# Patient Record
Sex: FEMALE | Race: BLACK OR AFRICAN AMERICAN | Employment: UNEMPLOYED | URBAN - METROPOLITAN AREA
[De-identification: names, ages, dates, MRNs, and addresses within clinical notes are randomized per-mention and may not be internally consistent; named-entity substitution may affect disease eponyms.]

---

## 2017-07-21 ENCOUNTER — OFFICE VISIT (OUTPATIENT)
Dept: FAMILY MEDICINE | Facility: CLINIC | Age: 62
End: 2017-07-21
Payer: COMMERCIAL

## 2017-07-21 VITALS
WEIGHT: 182 LBS | BODY MASS INDEX: 33.49 KG/M2 | RESPIRATION RATE: 16 BRPM | HEIGHT: 62 IN | DIASTOLIC BLOOD PRESSURE: 62 MMHG | TEMPERATURE: 98.6 F | HEART RATE: 78 BPM | SYSTOLIC BLOOD PRESSURE: 112 MMHG | OXYGEN SATURATION: 99 %

## 2017-07-21 DIAGNOSIS — R10.12 LEFT UPPER QUADRANT PAIN: ICD-10-CM

## 2017-07-21 DIAGNOSIS — E78.5 HYPERLIPIDEMIA WITH TARGET LDL LESS THAN 100: Chronic | ICD-10-CM

## 2017-07-21 DIAGNOSIS — Z22.7 LATENT TUBERCULOSIS BY BLOOD TEST: ICD-10-CM

## 2017-07-21 DIAGNOSIS — E66.01 MORBID OBESITY DUE TO EXCESS CALORIES (H): ICD-10-CM

## 2017-07-21 DIAGNOSIS — E11.9 CONTROLLED TYPE 2 DIABETES MELLITUS WITHOUT COMPLICATION, WITH LONG-TERM CURRENT USE OF INSULIN (H): Primary | ICD-10-CM

## 2017-07-21 DIAGNOSIS — M54.2 CERVICALGIA: ICD-10-CM

## 2017-07-21 DIAGNOSIS — G89.29 CHRONIC LOW BACK PAIN WITHOUT SCIATICA, UNSPECIFIED BACK PAIN LATERALITY: ICD-10-CM

## 2017-07-21 DIAGNOSIS — R10.13 ABDOMINAL PAIN, EPIGASTRIC: ICD-10-CM

## 2017-07-21 DIAGNOSIS — M54.50 CHRONIC LOW BACK PAIN WITHOUT SCIATICA, UNSPECIFIED BACK PAIN LATERALITY: ICD-10-CM

## 2017-07-21 DIAGNOSIS — Z79.4 CONTROLLED TYPE 2 DIABETES MELLITUS WITHOUT COMPLICATION, WITH LONG-TERM CURRENT USE OF INSULIN (H): Primary | ICD-10-CM

## 2017-07-21 DIAGNOSIS — K75.81 NASH (NONALCOHOLIC STEATOHEPATITIS): ICD-10-CM

## 2017-07-21 DIAGNOSIS — E55.9 VITAMIN D DEFICIENCY: Chronic | ICD-10-CM

## 2017-07-21 DIAGNOSIS — H81.12 BPPV (BENIGN PAROXYSMAL POSITIONAL VERTIGO), LEFT: ICD-10-CM

## 2017-07-21 DIAGNOSIS — F32.5 MAJOR DEPRESSION IN COMPLETE REMISSION (H): Chronic | ICD-10-CM

## 2017-07-21 DIAGNOSIS — K21.9 GASTROESOPHAGEAL REFLUX DISEASE WITHOUT ESOPHAGITIS: ICD-10-CM

## 2017-07-21 LAB
BASOPHILS # BLD AUTO: 0 10E9/L (ref 0–0.2)
BASOPHILS NFR BLD AUTO: 0.4 %
DIFFERENTIAL METHOD BLD: NORMAL
EOSINOPHIL # BLD AUTO: 0.2 10E9/L (ref 0–0.7)
EOSINOPHIL NFR BLD AUTO: 4.7 %
ERYTHROCYTE [DISTWIDTH] IN BLOOD BY AUTOMATED COUNT: 13.5 % (ref 10–15)
HBA1C MFR BLD: 5.8 % (ref 4.3–6)
HCT VFR BLD AUTO: 40.6 % (ref 35–47)
HGB BLD-MCNC: 13.6 G/DL (ref 11.7–15.7)
LYMPHOCYTES # BLD AUTO: 1.8 10E9/L (ref 0.8–5.3)
LYMPHOCYTES NFR BLD AUTO: 38.7 %
MCH RBC QN AUTO: 30.5 PG (ref 26.5–33)
MCHC RBC AUTO-ENTMCNC: 33.5 G/DL (ref 31.5–36.5)
MCV RBC AUTO: 91 FL (ref 78–100)
MONOCYTES # BLD AUTO: 0.3 10E9/L (ref 0–1.3)
MONOCYTES NFR BLD AUTO: 5.3 %
NEUTROPHILS # BLD AUTO: 2.4 10E9/L (ref 1.6–8.3)
NEUTROPHILS NFR BLD AUTO: 50.9 %
PLATELET # BLD AUTO: 208 10E9/L (ref 150–450)
RBC # BLD AUTO: 4.46 10E12/L (ref 3.8–5.2)
WBC # BLD AUTO: 4.7 10E9/L (ref 4–11)

## 2017-07-21 PROCEDURE — 99214 OFFICE O/P EST MOD 30 MIN: CPT | Performed by: FAMILY MEDICINE

## 2017-07-21 PROCEDURE — 84443 ASSAY THYROID STIM HORMONE: CPT | Performed by: FAMILY MEDICINE

## 2017-07-21 PROCEDURE — 80061 LIPID PANEL: CPT | Performed by: FAMILY MEDICINE

## 2017-07-21 PROCEDURE — 83036 HEMOGLOBIN GLYCOSYLATED A1C: CPT | Performed by: FAMILY MEDICINE

## 2017-07-21 PROCEDURE — 80048 BASIC METABOLIC PNL TOTAL CA: CPT | Performed by: FAMILY MEDICINE

## 2017-07-21 PROCEDURE — 84460 ALANINE AMINO (ALT) (SGPT): CPT | Performed by: FAMILY MEDICINE

## 2017-07-21 PROCEDURE — 82306 VITAMIN D 25 HYDROXY: CPT | Performed by: FAMILY MEDICINE

## 2017-07-21 PROCEDURE — 85025 COMPLETE CBC W/AUTO DIFF WBC: CPT | Performed by: FAMILY MEDICINE

## 2017-07-21 PROCEDURE — 36415 COLL VENOUS BLD VENIPUNCTURE: CPT | Performed by: FAMILY MEDICINE

## 2017-07-21 RX ORDER — MELOXICAM 15 MG/1
15 TABLET ORAL DAILY
Qty: 30 TABLET | Refills: 1 | Status: SHIPPED | OUTPATIENT
Start: 2017-07-21 | End: 2017-08-22

## 2017-07-21 RX ORDER — NICOTINE POLACRILEX 4 MG/1
40 GUM, CHEWING ORAL DAILY
Qty: 60 TABLET | Refills: 11 | Status: SHIPPED | OUTPATIENT
Start: 2017-07-21 | End: 2018-02-05

## 2017-07-21 NOTE — MR AVS SNAPSHOT
After Visit Summary   7/21/2017    Clemente Mancilla    MRN: [id number]           Patient Information     Date Of Birth          1955        Visit Information        Provider Department      7/21/2017 1:00 PM William Cook MD; [facility] [employer] [facility]        Today's Diagnoses     Controlled type 2 diabetes mellitus without complication, with long-term current use of insulin (H)    -  1    Hyperlipidemia with target LDL less than 100        Major depression in complete remission (H)        Osteoarthrosis involving lower leg        Cervicalgia        Chronic low back pain without sciatica, unspecified back pain laterality        KOHLER (nonalcoholic steatohepatitis)/US of 6-12        Morbid obesity due to excess calories (H)        Latent tuberculosis by blood test: 6-12 see letter from [facility] Pt refusing Rx         Vitamin D deficiency        Gastroesophageal reflux disease without esophagitis        Left upper quadrant pain        Abdominal pain, epigastric        BPPV (benign paroxysmal positional vertigo), left          Care Instructions    (E11.9,  Z79.4) Controlled type 2 diabetes mellitus without complication, with long-term current use of insulin (H)  (primary encounter diagnosis)  Comment:    Plan: Basic metabolic panel, Albumin Random Urine         Quantitative, Hemoglobin A1c, TSH             (E78.5) Hyperlipidemia with target LDL less than 100  Comment:    Plan: Lipid panel reflex to direct LDL, ALT             (F32.5) Major depression in complete remission (H)  Comment:    Plan:      (M17.10) Osteoarthrosis involving lower leg  Comment:    Plan: meloxicam (MOBIC) 15 MG tablet             (M54.2) Cervicalgia  Comment:    Plan:      (M54.5,  G89.29) Chronic low back pain without sciatica, unspecified back pain laterality  Comment:    Plan:      (K75.81) KOHLER (nonalcoholic steatohepatitis)/US of 6-12  Comment:     Plan:      (E66.01) Morbid obesity due to excess calories (H)  Comment:    Plan:      (R76.11) Latent tuberculosis by blood test: 6-12 see letter from [facility] refusing Rx   Comment:    Plan:      (E55.9) Vitamin D deficiency  Comment:    Plan: Vitamin D Deficiency             (K21.9) Gastroesophageal reflux disease without esophagitis  Comment:    Plan: CBC with platelets differential             (R10.12) Left upper quadrant pain  Comment:    Plan: omeprazole 20 MG tablet             (R10.13) Abdominal pain, epigastric  Comment:    Plan: omeprazole 20 MG tablet             (H81.12) BPPV (benign paroxysmal positional vertigo), left  Comment:    Plan: AUDIOLOGY BALANCE REFERRAL                         Follow-ups after your visit        Additional Services     AUDIOLOGY BALANCE REFERRAL       Your provider has referred you to: [facility] [phone] [email or web address]  BENIGN POSITIONAL VERTIGO LEFT SIDED     Audiology Balance Referral (Comprehensive) includes Videonystagmography (VNG), Rotational Chair testing, and Computerized Dynamic Posturography.  You may also select individual tests from the list below.    Procedure(s): Audiology Balance Referral Comprehensive (includes Videonystagmography (VNG), Rotational Chair Testing and Computerized Dynamic Posturography)                  Who to contact     If you have questions or need follow up information about today's clinic visit or your schedule please contact [facility] directly at [phone].  Normal or non-critical lab and imaging results will be communicated to you by MyChart, letter or phone within 4 business days after the clinic has received the results. If you do not hear from us within 7 days, please contact the clinic through MyChart or phone. If you have a critical or abnormal lab result, we will notify you by phone as soon as  "possible.  Submit refill requests through [employer] or call your pharmacy and they will forward the refill request to us. Please allow 3 business days for your refill to be completed.          Additional Information About Your Visit        [employer] Information     [employer] lets you send messages to your doctor, view your test results, renew your prescriptions, schedule appointments and more. To sign up, go to [email or web address] . Click on \"Log in\" on the left side of the screen, which will take you to the Welcome page. Then click on \"Sign up Now\" on the right side of the page.     You will be asked to enter the access code listed below, as well as some personal information. Please follow the directions to create your username and password.     Your access code is: [email or web address]  Expires: 10/19/2017  2:16 PM     Your access code will  in 90 days. If you need help or a new code, please call your [place] clinic or [phone].        Care EveryWhere ID     This is your Care EveryWhere ID. This could be used by other organizations to access your [place] medical records  [id number]        Your Vitals Were     Pulse Temperature Respirations Height Pulse Oximetry BMI (Body Mass Index)    78 98.6  F (37  C) (Tympanic) 16 5' 2\" (1.575 m) 99% 33.29 kg/m2       Blood Pressure from Last 3 Encounters:   17 112/62   11/06/15 112/68   10/02/15 110/62    Weight from Last 3 Encounters:   17 182 lb (82.6 kg)   11/06/15 186 lb 3.2 oz (84.5 kg)   10/02/15 187 lb 3.2 oz (84.9 kg)              We Performed the Following     Albumin Random Urine Quantitative     ALT     AUDIOLOGY BALANCE REFERRAL     Basic metabolic panel     CBC with platelets differential     DEPRESSION ACTION PLAN (DAP)     Hemoglobin A1c     Lipid panel reflex to direct LDL     TSH     Vitamin D Deficiency          Today's Medication Changes          These changes are accurate as of: 17  2:16 PM.  If you have any questions, ask your nurse " or doctor.               Start taking these medicines.        Dose/Directions    meloxicam 15 MG tablet   Commonly known as:  MOBIC   Used for:  Osteoarthrosis involving lower leg   Started by:  Willima Cook MD        Dose:  15 mg   Take 1 tablet (15 mg) by mouth daily   Quantity:  30 tablet   Refills:  1         These medicines have changed or have updated prescriptions.        Dose/Directions    omeprazole 20 MG tablet   This may have changed:  how much to take   Used for:  Left upper quadrant pain, Abdominal pain, epigastric   Changed by:  William Cook MD        Dose:  40 mg   Take 2 tablets (40 mg) by mouth daily Take 30-60 minutes before a meal.   Quantity:  60 tablet   Refills:  11            Where to get your medicines      These medications were sent to [facility] - [place], [place] - [address] [facility]  [address] [address]     Phone:  [phone]     meloxicam 15 MG tablet    omeprazole 20 MG tablet                Primary Care Provider Office Phone # Fax #    Payal Julianna Cook [phone]       [facility] [id number] [facility] [address]        Equal Access to Services     DENISE [facility] Hospital[facility] : Hadii aad ku hadasho Soomaali, waaxda luqadaha, qaybta kaalmada adeegyada, waxay bertha zeen orion salguero. So [facility] [phone].    ATENCIÓN: Si habla español, tiene a morgan disposición servicios gratuitos de asistencia lingüística. Llame al [phone].    We comply with applicable federal civil rights laws and [place] laws. We do not discriminate on the basis of race, color, national origin, age, disability sex, sexual orientation or gender identity.            Thank you!     Thank you for choosing [facility]  for your care. Our goal is always to provide you with excellent care. Hearing back from our patients is one way we can continue to improve our services. Please take a  few minutes to complete the written survey that you may receive in the mail after your visit with us. Thank you!             Your Updated Medication List - Protect others around you: Learn how to safely use, store and throw away your medicines at www.disposemymeds.org.          This list is accurate as of: 7/21/17  2:16 PM.  Always use your most recent med list.                   Brand Name Dispense Instructions for use Diagnosis    * acetaminophen 500 MG tablet    TYLENOL    120 tablet    Take 1-2 tablets (500-1,000 mg) by mouth every 6 hours as needed for mild pain    Arthralgia of both knees       * acetaminophen 500 MG tablet    TYLENOL    120 tablet    Take 1-2 tablets (500-1,000 mg) by mouth every 6 hours as needed for mild pain    Primary osteoarthritis of both knees       alum & mag hydroxide-simethicone 400-400-40 MG/5ML Susp suspension    MYLANTA ES/MAALOX  ES    1 Bottle    Take 30 mLs by mouth 4 times daily as needed for indigestion    Left upper quadrant pain, Abdominal pain, epigastric       cholecalciferol 22295 UNITS capsule    VITAMIN D3    4 capsule    Take 1 capsule (50,000 Units) by mouth once a week    Vitamin D deficiency       Glucosamine Sulfate 750 MG Tabs     60 tablet    Take 2 tablets by mouth daily (with breakfast)    Primary osteoarthritis of both knees       lidocaine 5 % ointment    XYLOCAINE    142 g    One application 4 x daily to affected areas lower back and knees if necessary    Knee pain, unspecified laterality       meloxicam 15 MG tablet    MOBIC    30 tablet    Take 1 tablet (15 mg) by mouth daily    Osteoarthrosis involving lower leg       metFORMIN 500 MG tablet    GLUCOPHAGE    60 tablet    TAKE 1 TABLET (500 MG) BY MOUTH 2 TIMES DAILY (WITH MEALS)    Type 2 diabetes, HbA1C goal < 8% (H)       omeprazole 20 MG tablet     60 tablet    Take 2 tablets (40 mg) by mouth daily Take 30-60 minutes before a meal.    Left upper quadrant pain, Abdominal pain, epigastric       order  for DME     4 Device    Equipment being ordered:  ONE PAIR HEEL CUPS* ONE PAIR* APPLY DAILY TO FOOT WARE* TO PROTECT ACHILLES TENDONS*  FLESH COLORED BELOW THE KNEE STOCKINGS* 15-20 milimeters mercury  3 PAIRS* USE DAILY  WASH DAILY    Edema       traMADol-acetaminophen 37.5-325 MG per tablet    ULTRACET    120 tablet    Take 1 tablet by mouth every 6 hours as needed for moderate pain    Hip pain, right, Knee pain, unspecified laterality       * Notice:  This list has 2 medication(s) that are the same as other medications prescribed for you. Read the directions carefully, and ask your doctor or other care provider to review them with you.

## 2017-07-21 NOTE — NURSING NOTE
"Chief Complaint   Patient presents with     Knee Pain       Initial /62  Pulse 78  Temp 98.6  F (37  C) (Tympanic)  Resp 16  Ht 5' 2\" (1.575 m)  Wt 182 lb (82.6 kg)  SpO2 99%  BMI 33.29 kg/m2 Estimated body mass index is 33.29 kg/(m^2) as calculated from the following:    Height as of this encounter: 5' 2\" (1.575 m).    Weight as of this encounter: 182 lb (82.6 kg).  Medication Reconciliation: complete   Breonna Dumas CMA    "

## 2017-07-21 NOTE — PROGRESS NOTES
SUBJECTIVE:                                                    Clemente Mancilla is a 62 year old female who presents to clinic today for the following health issues:  Health Maintenance Due   Topic Date Due     PNEUMOVAX 1X HI RISK PATIENT < 65 (NO IB MSG)  01/01/1959     MIGRAINE ACTION PLAN  01/01/1975     TETANUS IMMUNIZATION (SYSTEM ASSIGNED)  01/01/1975     PAP SCREENING Q3 YR (SYSTEM ASSIGNED)  01/01/1978     COLON CANCER SCREEN (SYSTEM ASSIGNED)  01/01/2007     MAMMO SCREEN Q2 YR (SYSTEM ASSIGNED)  12/01/2010     ADVANCE DIRECTIVE PLANNING Q5 YRS  01/01/2012     CREATININE Q1 YEAR  02/02/2013     TSH W/ FREE T4 REFLEX Q2 YEAR  05/09/2013     MICROALBUMIN Q1 YEAR  06/06/2013     FOOT EXAM Q1 YEAR  04/18/2015     EYE EXAM Q1 YEAR  04/18/2015     DEPRESSION ACTION PLAN Q1 YR  04/18/2015     A1C Q6 MO  03/18/2016     PHQ-9 Q6 MONTHS  03/18/2016     LIPID MONITORING Q1 YEAR  09/18/2016     Health Maintenance reviewed at today's visit patient asked to schedule/complete:   Depression:  Completed at today's visit.  Discuss the rest with       Musculoskeletal problem/pain  Back finger joints  And       Duration: 15 years    Description  Location: both    Intensity:  4-5/10    Accompanying signs and symptoms: none    History  Previous similar problem: YES  Previous evaluation:  x-ray    Precipitating or alleviating factors:  Trauma or overuse: no   Aggravating factors include: sitting, standing and changing position    Therapies tried and outcome: aleve helps    Vertigo on the left   Diabetes Follow-up      Patient is checking blood sugars: not at all    Diabetic concerns: None     Symptoms of hypoglycemia (low blood sugar): none     Paresthesias (numbness or burning in feet) or sores: No     Date of last diabetic eye exam: DIABETES 2 GOAL 8%         Amount of exercise or physical activity: 6-7 days/week for an average of 30-45 minutes    Problems taking medications regularly: No    Medication side effects: no muscle  aches  Diet: low salt, low fat/cholesterol and diabetic       .  Current Outpatient Prescriptions   Medication Sig Dispense Refill     omeprazole 20 MG tablet Take 2 tablets (40 mg) by mouth daily Take 30-60 minutes before a meal. 60 tablet 11     meloxicam (MOBIC) 15 MG tablet Take 1 tablet (15 mg) by mouth daily 30 tablet 1     cholecalciferol (VITAMIN D3) 35462 UNITS capsule Take 1 capsule (50,000 Units) by mouth once a week 4 capsule 11     metFORMIN (GLUCOPHAGE) 500 MG tablet TAKE 1 TABLET (500 MG) BY MOUTH 2 TIMES DAILY (WITH MEALS) 60 tablet 0     acetaminophen (TYLENOL) 500 MG tablet Take 1-2 tablets (500-1,000 mg) by mouth every 6 hours as needed for mild pain (Patient not taking: Reported on 7/21/2017) 120 tablet 11     Glucosamine Sulfate 750 MG TABS Take 2 tablets by mouth daily (with breakfast) (Patient not taking: Reported on 7/21/2017) 60 tablet 11     acetaminophen (TYLENOL) 500 MG tablet Take 1-2 tablets (500-1,000 mg) by mouth every 6 hours as needed for mild pain (Patient not taking: Reported on 7/21/2017) 120 tablet 11     alum & mag hydroxide-simethicone (MYLANTA ES/MAALOX  ES) 400-400-40 MG/5ML SUSP Take 30 mLs by mouth 4 times daily as needed for indigestion (Patient not taking: Reported on 7/21/2017) 1 Bottle 11     order for DME Equipment being ordered:  ONE PAIR HEEL CUPS*  ONE PAIR*  APPLY DAILY TO FOOT WARE*  TO PROTECT ACHILLES TENDONS*    FLESH COLORED BELOW THE KNEE STOCKINGS*  15-20 milimeters mercury   3 PAIRS*  USE DAILY   WASH DAILY (Patient not taking: Reported on 7/21/2017) 4 Device 2     traMADol-acetaminophen (ULTRACET) 37.5-325 MG per tablet Take 1 tablet by mouth every 6 hours as needed for moderate pain (Patient not taking: Reported on 7/21/2017) 120 tablet 1     lidocaine (XYLOCAINE) 5 % ointment One application 4 x daily to affected areas lower back and knees if necessary (Patient not taking: Reported on 7/21/2017) 142 g 11        No Known Allergies      There is no  immunization history on file for this patient.      reports that she does not drink alcohol.      reports that she does not use illicit drugs.    family history includes Family History Negative in her father, mother, and another family member.    indicated that her mother is . She indicated that her father is . She indicated that both of her daughters are alive. She indicated that all of her three sons are alive. She indicated that the status of her other is unknown.      has a past surgical history that includes Cholecystectomy (2000) and GYN surgery ().     reports that she does not engage in sexual activity.  .  Pediatric History   Patient Guardian Status     Not on file.     Other Topics Concern     Parent/Sibling W/ Cabg, Mi Or Angioplasty Before 65f 55m? No     Social History Narrative    Surgical History  Return To Top     Status Surgery Time Frame Comment Record Date     Inactive  gyn surgery  5 years ago    2008      Inactive  cholecsystectomy  2000          --------------------------------------------------------------------------------    Food Allergy  Return To Top     Allergen Reaction Comment Record Date     * No known food allergies      2008          --------------------------------------------------------------------------------    Drug Allergy  Return To Top     Allergen Reaction Comment Record Date     * No known drug allergies      2008          --------------------------------------------------------------------------------    Environment Allergy  Return To Top     Allergen Reaction Comment Record Date     * No known environmental allergies      3/9/2010          --------------------------------------------------------------------------------    Social History  Return To Top     Question Answer Comment Record Date     Marital status      2008      Advance Directive or Living Will  No    4/15/2010      Emotional Abuse  No     4/15/2010      Exercise  No    4/15/2010      Caffeine  Yes    5/20/2010      Physical Abuse  No    4/15/2010      Sealtbelts  Yes    4/15/2010      Sexual Abuse  No    4/15/2010      Breast/Testicle Self Check  Yes    5/20/2010      Number of children  5    11/26/2008      Living arrangements  House    11/26/2008      Number of children in household  0    4/15/2010      Number of adults in household  1    4/15/2010      Education level  none    4/15/2010      Employment  Currently unemployed    4/15/2010      Tobacco history  Has never smoked or chewed tobacco    11/26/2008      Alcohol history  Never drinks alcohol    4/15/2010      Has the patient ever used illegal drugs?  Has never used illegal drugs    4/15/2010      Has the patient used marijuana?  No    4/15/2010      Has the patient used cocaine?  No    4/15/2010          --------------------------------------------------------------------------------    Medical History Return To Top     Status Diagnosis Time Frame Comment Record Date     Active (924.11) - C - Knee contusion    RIGHT KNEE  2/21/2012      Active (780.52) - C - Insomnia      2/2/2012      Active (268.9) - C - VITAMIN D DEFICIENCY      2/2/2012      Active (250.02) - C - Diabetes mellitus, type II uncontrolled    NEWLY DISCOVERED AT GYNECOLOGIST  9/13/2010      Active (715.96) - C - Osteoarthritis of knee    right knee  9/13/2010      Active (789.02) - C - Left upper quadrant abdominal pain      7/1/2010      Active (719.41) - C - Shoulder pain      5/20/2010      Active (300.02) - C - Anxiety disorder, generalized      4/7/2010      Active (785.1) - C - Palpitations      3/9/2010      Active (716.50) - C - UNSPEC POLYARTHRITIS UNSP      1/12/2010      Active 719.46 Knee pain      1/12/2010      Active (250.00) - C - Diabetes mellitus, type II controlled      1/12/2010      Active (272.4) - C - Hyperlipidemia      12/2/2009      Active (716.54) - C - POLYARTHRITIS UNSP HAND      8/20/2009       Active 372.14 Allergic conjunctivitis      8/20/2009      Active (401.1) - C - Hypertension, benign      1/27/2009      Active 346.10 Migraine, common, not intractable    INTERMITTENT SICK HEADACHES FOR MANY YEARS  1/27/2009      Active V76.12 Screening, mammogram      11/12/2008      Active (311) - C - Depression Disorder, not elsewhere specified      11/6/2008      Active (723.1) - C - Neck pain      11/6/2008      Active (780.79) - C - Fatigue      11/6/2008      Active (530.81) - C - GERD      11/6/2008      Active (784.0) - C - Headache, unspec.      11/6/2008      Active (724.2) - C - Low back pain      11/6/2008      Active (278.00) - C - Obesity      11/6/2008      Inactive  V77.91 Screening, lipids      11/6/2008      Inactive  V82.81 Screening, osteoporosis      11/6/2008      Active Abruptio placentae      2/6/2012          --------------------------------------------------------------------------------        --------------------------------------------------------------------------------    Infection History Return To Top     Question Answer Comment Record Date     Other  HIV    11/6/2008      History of HPV  No    11/6/2008      History of gonorrhea  No    11/6/2008      History of syphilis  No    11/6/2008      Live with someone with TB or exposed to TB  No    11/6/2008      History of Hepatitis B  No    11/6/2008      History of chlamydia  No    11/6/2008          --------------------------------------------------------------------------------                 reports that she has never smoked. She has never used smokeless tobacco.    Medical, social, surgical, and family histories reviewed.      Labs reviewed in EPIC  BP Readings from Last 3 Encounters:   07/21/17 112/62   11/06/15 112/68   10/02/15 110/62    Wt Readings from Last 3 Encounters:   07/21/17 182 lb (82.6 kg)   11/06/15 186 lb 3.2 oz (84.5 kg)   10/02/15 187 lb 3.2 oz (84.9 kg)                  Patient Active Problem List   Diagnosis      Contusion of knee     Vitamin D deficiency     Insomnia     Osteoarthrosis involving lower leg     Abdominal pain, left upper quadrant     Pain in joint, shoulder region     Generalized anxiety disorder     Palpitations     Diabetes mellitus type 2, controlled, without complications (H)     Unspecified polyarthropathy or polyarthritis, site unspecified     Pain in joint, lower leg     Hyperlipidemia     Polyarthropathy or polyarthritis, hand     Other chronic allergic conjunctivitis     Migraine without aura     Morbid obesity due to excess calories (H)     Major depression in complete remission (H)     Esophageal reflux     Cervicalgia     Lumbago     Malaise and fatigue     Headache     Abruptio placenta     Hypertension goal BP (blood pressure) < 130/80     Latent tuberculosis by blood test: 6-12 see letter from [facility] refusing Rx      Obesity     KOHLER (nonalcoholic steatohepatitis)/US of 6-12     Weight loss from 213# in 2007 to 198# now      Hyperlipidemia with target LDL less than 100     Past Surgical History:   Procedure Laterality Date     CHOLECYSTECTOMY  11/06/2000     GYN SURGERY  2003       Social History   Substance Use Topics     Smoking status: Never Smoker     Smokeless tobacco: Never Used     Alcohol use No     Family History   Problem Relation Age of Onset     Family History Negative Mother      Family History Negative Father      Family History Negative Other      neg for arthritis         Current Outpatient Prescriptions   Medication Sig Dispense Refill     omeprazole 20 MG tablet Take 2 tablets (40 mg) by mouth daily Take 30-60 minutes before a meal. 60 tablet 11     meloxicam (MOBIC) 15 MG tablet Take 1 tablet (15 mg) by mouth daily 30 tablet 1     cholecalciferol (VITAMIN D3) 33735 UNITS capsule Take 1 capsule (50,000 Units) by mouth once a week 4 capsule 11     metFORMIN (GLUCOPHAGE) 500 MG tablet TAKE 1 TABLET (500 MG) BY MOUTH 2 TIMES DAILY (WITH MEALS) 60 tablet 0      acetaminophen (TYLENOL) 500 MG tablet Take 1-2 tablets (500-1,000 mg) by mouth every 6 hours as needed for mild pain (Patient not taking: Reported on 7/21/2017) 120 tablet 11     Glucosamine Sulfate 750 MG TABS Take 2 tablets by mouth daily (with breakfast) (Patient not taking: Reported on 7/21/2017) 60 tablet 11     acetaminophen (TYLENOL) 500 MG tablet Take 1-2 tablets (500-1,000 mg) by mouth every 6 hours as needed for mild pain (Patient not taking: Reported on 7/21/2017) 120 tablet 11     alum & mag hydroxide-simethicone (MYLANTA ES/MAALOX  ES) 400-400-40 MG/5ML SUSP Take 30 mLs by mouth 4 times daily as needed for indigestion (Patient not taking: Reported on 7/21/2017) 1 Bottle 11     order for DME Equipment being ordered:  ONE PAIR HEEL CUPS*  ONE PAIR*  APPLY DAILY TO FOOT WARE*  TO PROTECT ACHILLES TENDONS*    FLESH COLORED BELOW THE KNEE STOCKINGS*  15-20 milimeters mercury   3 PAIRS*  USE DAILY   WASH DAILY (Patient not taking: Reported on 7/21/2017) 4 Device 2     traMADol-acetaminophen (ULTRACET) 37.5-325 MG per tablet Take 1 tablet by mouth every 6 hours as needed for moderate pain (Patient not taking: Reported on 7/21/2017) 120 tablet 1     lidocaine (XYLOCAINE) 5 % ointment One application 4 x daily to affected areas lower back and knees if necessary (Patient not taking: Reported on 7/21/2017) 142 g 11     No Known Allergies  Recent Labs   Lab Test  07/21/17   1356  09/18/15   1436  06/06/12   1939  02/02/12   1933  05/09/11   2010   A1C  5.8  5.6  6.2*  6.0   --    LDL   --   109  115.4*  111.0*  115*   HDL   --   50*  38  45  47   TRIG   --   68  123  95  101   ALT   --   31   --   42.0  26   CR   --    --   0.6  0.5*  0.67   POTASSIUM   --    --   4.3  4.2  4.3        BP Readings from Last 6 Encounters:   07/21/17 112/62   11/06/15 112/68   10/02/15 110/62   09/18/15 114/60   08/06/14 110/70   04/18/14 116/72       Wt Readings from Last 3 Encounters:   07/21/17 182 lb (82.6 kg)   11/06/15 186  lb 3.2 oz (84.5 kg)   10/02/15 187 lb 3.2 oz (84.9 kg)         Positive symptoms or findings indicated by bold designation:     ROS: 10 point ROS neg other than the symptoms noted above in the HPI.except  has Contusion of knee; Vitamin D deficiency; Insomnia; Osteoarthrosis involving lower leg; Abdominal pain, left upper quadrant; Pain in joint, shoulder region; Generalized anxiety disorder; Palpitations; Diabetes mellitus type 2, controlled, without complications (H); Unspecified polyarthropathy or polyarthritis, site unspecified; Pain in joint, lower leg; Hyperlipidemia; Polyarthropathy or polyarthritis, hand; Other chronic allergic conjunctivitis; Migraine without aura; Morbid obesity due to excess calories (H); Major depression in complete remission (H); Esophageal reflux; Cervicalgia; Lumbago; Malaise and fatigue; Headache; Abruptio placenta; Hypertension goal BP (blood pressure) < 130/80; Latent tuberculosis by blood test: 6-12 see letter from [facility] Pt refusing Rx ; Obesity; KOHLER (nonalcoholic steatohepatitis)/US of 6-12; Weight loss from 213# in 2007 to 198# now ; and Hyperlipidemia with target LDL less than 100 on her problem list.     NONALCOHOLIC STEATOHEPATITIS IMPROVED with WEIGHT LOSS     KNEE PAIN IMPROVED     VITAMIN D REPLACEMENT      INSOMNIA INTERMITTENT    OSTEOARTHRITIS KNEES     SHOULDER PAINS     GASTROESOPHAGEAL REFLUX DISEASE WITHOUT ESOPHAGITIS ON MEDICATIONS     LOWER BACK PAIN with RADICULOPATHY     POLYARTHRITIS OF HANDS       Constitutional: The patient denied fatigue, fever, insomnia, night sweats, recent illness and weight loss.  NORMAL     Eyes: The patient denied blindness, eye pain, eye tearing, photophobia, vision change and visual disturbance. NORMAL       Ears/Nose/Throat/Neck: The patient denied dizziness, facial pain, hearing loss, nasal discharge, oral pain, otalgia, postnasal drip, sinus congestion, sore throat, tinnitus and voice change.   NORMAL      Cardiovascular: The patient denied arrhythmia, chest pain/pressure, claudication, edema, exercise intolerance, fatigue, orthopnea, palpitations and syncope.      Respiratory: The patient denied asthma, chest congestion, cough, dyspnea on exertion, dyspnea/shortness of breath, hemoptysis, pedal edema, pleuritic pain, productive sputum, snoring and wheezing. NORMAL SHORTNESS OF BREATH NO DIG WHEEZING    Gastrointestinal: The patient denied abdominal pain, anorexia, constipation, diarrhea, dysphagia, gastroesophageal reflux, hematochezia, hemorrhoids, melena, nausea and vomiting . GASTROESOPHAGEAL REFLUX DISEASE WITHOUT ESOPHAGITIS     Genitourinary/Nephrology: The patient denied breast complaint, dysuria, nocturia sexual dysfunction, t, urinary frequency, urinary incontinence, urinary urgency    NORMAL      Musculoskeletal: The patient denied arthralgia(s), back pain, joint complaint, muscle weakness, myalgias, osteoporosis, sciatica, stiffness and swelling.  OSTEOARTHRITIS KNEES   RECOVERED KNEE CONTUSION   LOWER BACK PAIN AND SHOULDER PAIN IMPROVED      Dermatoligic:: The patient denied acne, dermatitis, ecchymosis, itching, mole change, rash, skin cancer, skin lesion and sores.  NORMAL      Neurologic: The patient denied dizziness, gait abnormality, headache, memory loss, mental status change, paresis, paresthesia, seizure, syncope, tremor and vision change. NORMAL       Psychiatric: The patient denied anxiety, depression, disturbances of memory, drug abuse, insomnia, mood swings and relationship difficulties.  NORMAL      Endocrine: The patient denied , goiter, obesity, polyuria and thyroid disease.  NORMAL       Hematologic/Lymphatic: The patient denied abnormal bleeding and bruising, abnormal ecchymoses, anemia, lymph node enlargement/mass, petechiae and venous  Thrombosis.  NORMAL     Allergy/Immunology: The patient denied food allergy and  Allergic rhinitis or conjunctivitis.  NORMAL        PE:  /62  " Pulse 78  Temp 98.6  F (37  C) (Tympanic)  Resp 16  Ht 5' 2\" (1.575 m)  Wt 182 lb (82.6 kg)  SpO2 99%  BMI 33.29 kg/m2 Body mass index is 33.29 kg/(m^2).    Constitutional: general appearance, well nourished, well developed, in no acute distress, well developed, appears stated age, normal body habitus,  MODERATE OBESITY      Eyes:; The patient has normal eyelids sclerae and conjunctivae : NORMAL      Ears/Nose/Throat: external ear, overall: normal appearance; external nose, overall: benign appearance, normal moujth gums and lips  The patient has:  NORMAL     Neck: thyroid, overall: normal size, normal consistency, nontender,  NORMAL     Respiratory:  palpation of chest, overall: normal excursion,  NORMAL     Clear to percussion and auscultation  NORMAL      Tachypnea  NORMAL   Color  NORMAL      Cardiovascular:  Good color with no peripheral edema  NORMAL    Regular sinus rhythm without murmur. Physiologic heart sounds Heart is unelarged  .   Chest/Breast: normal shape  NORMAL       Abdominal exam,  Liver and spleen are  unenlarged  NORMAL        Tenderness  NORMAL    Scars  NORMAL      Urogenital; no renal, flank or bladder  tenderness;  NORMAL      Lymphatic: neck nodes,  NORMAL     Other nodes NOT APPLICABLE       Musculoskeletal:  Brief ortho exam normal except:   NORMAL      Integument: inspection of skin, no rash, lesions; and, palpation, no induration, no tenderness.  NORMAL      Neurologic mental status, overall: alert and oriented; gait, no ataxia, no unsteadiness; coordination, no tremors; cranial nerves, overall: normal motor, overall: normal bulk, tone.  NORMAL      Psychiatric: orientation/consciousness, overall: oriented to person, place and time; behavior/psychomotor activity, no tics, normal psychomotor activity; mood and affect, overall: normal mood and affect; appearance, overall: well-groomed, good eye contact; speech, overall: normal quality, no aphasia and normal quality, quantity, " intact.  NORMAL      Diagnostic Test Results:  Results for orders placed or performed in visit on 07/21/17   Hemoglobin A1c   Result Value Ref Range    Hemoglobin A1C 5.8 4.3 - 6.0 %   CBC with platelets differential   Result Value Ref Range    WBC 4.7 4.0 - 11.0 10e9/L    RBC Count 4.46 3.8 - 5.2 10e12/L    Hemoglobin 13.6 11.7 - 15.7 g/dL    Hematocrit 40.6 35.0 - 47.0 %    MCV 91 78 - 100 fl    MCH 30.5 26.5 - 33.0 pg    MCHC 33.5 31.5 - 36.5 g/dL    RDW 13.5 10.0 - 15.0 %    Platelet Count 208 150 - 450 10e9/L    Diff Method Automated Method     % Neutrophils 50.9 %    % Lymphocytes 38.7 %    % Monocytes 5.3 %    % Eosinophils 4.7 %    % Basophils 0.4 %    Absolute Neutrophil 2.4 1.6 - 8.3 10e9/L    Absolute Lymphocytes 1.8 0.8 - 5.3 10e9/L    Absolute Monocytes 0.3 0.0 - 1.3 10e9/L    Absolute Eosinophils 0.2 0.0 - 0.7 10e9/L    Absolute Basophils 0.0 0.0 - 0.2 10e9/L         ICD-10-CM    1. Controlled type 2 diabetes mellitus without complication, with long-term current use of insulin (H) E11.9 Basic metabolic panel    Z79.4 Hemoglobin A1c     TSH     Albumin Random Urine Quantitative     CANCELED: Albumin Random Urine Quantitative   2. Hyperlipidemia with target LDL less than 100 E78.5 Lipid panel reflex to direct LDL     ALT   3. Major depression in complete remission (H) F32.5    4. Osteoarthrosis involving lower leg M17.10 meloxicam (MOBIC) 15 MG tablet   5. Cervicalgia M54.2    6. Chronic low back pain without sciatica, unspecified back pain laterality M54.5     G89.29    7. KOHLER (nonalcoholic steatohepatitis)/US of 6-12 K75.81    8. Morbid obesity due to excess calories (H) E66.01    9. Latent tuberculosis by blood test: 6-12 see letter from [facility] refusing Rx  R76.11    10. Vitamin D deficiency E55.9 Vitamin D Deficiency   11. Gastroesophageal reflux disease without esophagitis K21.9 CBC with platelets differential   12. Left upper quadrant pain R10.12 omeprazole 20 MG tablet    13. Abdominal pain, epigastric R10.13 omeprazole 20 MG tablet   14. BPPV (benign paroxysmal positional vertigo), left H81.12 AUDIOLOGY BALANCE REFERRAL        .    Side effects benefits and risks thoroughly discussed. .she may come in early if unimproved or getting worse          Importance of adhering to regimen discussed and if medications were dispensed, the importance of taking medications discussed and bringing in the medications after every visit for chronic problems         Please drink 2 glasses of water prior to meals and walk 15-30 minutes after meals    I spent 25 MINUTES SPENT  with patient discussing the following issues   The primary encounter diagnosis was Controlled type 2 diabetes mellitus without complication, with long-term current use of insulin (H). Diagnoses of Hyperlipidemia with target LDL less than 100, Major depression in complete remission (H), Osteoarthrosis involving lower leg, Cervicalgia, Chronic low back pain without sciatica, unspecified back pain laterality, KOHLER (nonalcoholic steatohepatitis)/US of 6-12, Morbid obesity due to excess calories (H), Latent tuberculosis by blood test: 6-12 see letter from [facility] Pt refusing Rx , Vitamin D deficiency, Gastroesophageal reflux disease without esophagitis, Left upper quadrant pain, Abdominal pain, epigastric, and BPPV (benign paroxysmal positional vertigo), left were also pertinent to this visit. over half of which involved counseling and coordination of care.    Patient Instructions   (E11.9,  Z79.4) Controlled type 2 diabetes mellitus without complication, with long-term current use of insulin (H)  (primary encounter diagnosis)  Comment:    Plan: Basic metabolic panel, Albumin Random Urine         Quantitative, Hemoglobin A1c, TSH             (E78.5) Hyperlipidemia with target LDL less than 100  Comment:    Plan: Lipid panel reflex to direct LDL, ALT             (F32.5) Major depression in complete remission  (H)  Comment:    Plan:      (M17.10) Osteoarthrosis involving lower leg  Comment:    Plan: meloxicam (MOBIC) 15 MG tablet             (M54.2) Cervicalgia  Comment:    Plan:      (M54.5,  G89.29) Chronic low back pain without sciatica, unspecified back pain laterality  Comment:    Plan:      (K75.81) KOHLER (nonalcoholic steatohepatitis)/US of 6-12  Comment:    Plan:      (E66.01) Morbid obesity due to excess calories (H)  Comment:    Plan:      (R76.11) Latent tuberculosis by blood test: 6-12 see letter from [facility] Pt refusing Rx   Comment:    Plan:      (E55.9) Vitamin D deficiency  Comment:    Plan: Vitamin D Deficiency             (K21.9) Gastroesophageal reflux disease without esophagitis  Comment:    Plan: CBC with platelets differential             (R10.12) Left upper quadrant pain  Comment:    Plan: omeprazole 20 MG tablet             (R10.13) Abdominal pain, epigastric  Comment:    Plan: omeprazole 20 MG tablet             (H81.12) BPPV (benign paroxysmal positional vertigo), left  Comment:    Plan: AUDIOLOGY BALANCE REFERRAL                 EPLEY MANEUVER DISCUSSION   LEFT BPPV       ALL THE ABOVE PROBLEMS ARE STABLE AND MED CHANGES AS NOTED    Diet:  MEDITERRANEAN DIET     Exercise:    Exercises Range of motion, balance, isometric, and strengthening exercises 30 repetitions twice daily of involved joints      .ROCKY RUANO MD 7/21/2017 5:23 PM  July 21, 2017

## 2017-07-21 NOTE — LETTER
"      Clemente Mancilla  [id number]  [facility] [address]        July 24, 2017          Dear ,    We are writing to inform you of your test results.    NORMAL TOTAL CHOLESTEROL   NORMAL TRIGLYCERIDES   BORDERLINE  HDL OR \"GOOD\" CHOLESTEROL   BORDERLINE  LDL OR \"BAD\" CHOLESTEROL   BORDERLINE  VERY LOW DENSITY CHOLESTEROL   NORMAL LIVER FUNCTION TEST   NORMAL THYROID STIMULATING HORMONE TEST   BORDERLINE  CHLORIDE LEVEL   BORDERLINE  GLUCOSE   NORMAL RENAL FUNCTION AND BLOOD SALTS    Resulted Orders   Basic metabolic panel   Result Value Ref Range    Sodium 142 133 - 144 mmol/L    Potassium 3.9 3.4 - 5.3 mmol/L    Chloride 111 (H) 94 - 109 mmol/L    Carbon Dioxide 25 20 - 32 mmol/L    Anion Gap 6 3 - 14 mmol/L    Glucose 120 (H) 70 - 99 mg/dL      Comment:      Non Fasting    Urea Nitrogen 11 7 - 30 mg/dL    Creatinine 0.65 0.52 - 1.04 mg/dL    GFR Estimate >90  Non  GFR Calc   >60 mL/min/1.7m2    GFR Estimate If Black >90   GFR Calc   >60 mL/min/1.7m2    Calcium 9.0 8.5 - 10.1 mg/dL   Lipid panel reflex to direct LDL   Result Value Ref Range    Cholesterol 188 <200 mg/dL    Triglycerides 86 <150 mg/dL      Comment:      Non Fasting    HDL Cholesterol 46 (L) >49 mg/dL    LDL Cholesterol Calculated 125 (H) <100 mg/dL      Comment:      Above desirable:  100-129 mg/dl   Borderline High:  130-159 mg/dL   High:             160-189 mg/dL   Very high:       >189 mg/dl      Non HDL Cholesterol 142 (H) <130 mg/dL      Comment:      Above Desirable:  130-159 mg/dl   Borderline high:  160-189 mg/dl   High:             190-219 mg/dl   Very high:       >219 mg/dl     Hemoglobin A1c   Result Value Ref Range    Hemoglobin A1C 5.8 4.3 - 6.0 %   ALT   Result Value Ref Range    ALT 23 0 - 50 U/L   CBC with platelets differential   Result Value Ref Range    WBC 4.7 4.0 - 11.0 10e9/L    RBC Count 4.46 3.8 - 5.2 10e12/L    Hemoglobin 13.6 11.7 - 15.7 g/dL    Hematocrit 40.6 35.0 - 47.0 %    " MCV 91 78 - 100 fl    MCH 30.5 26.5 - 33.0 pg    MCHC 33.5 31.5 - 36.5 g/dL    RDW 13.5 10.0 - 15.0 %    Platelet Count 208 150 - 450 10e9/L    Diff Method Automated Method     % Neutrophils 50.9 %    % Lymphocytes 38.7 %    % Monocytes 5.3 %    % Eosinophils 4.7 %    % Basophils 0.4 %    Absolute Neutrophil 2.4 1.6 - 8.3 10e9/L    Absolute Lymphocytes 1.8 0.8 - 5.3 10e9/L    Absolute Monocytes 0.3 0.0 - 1.3 10e9/L    Absolute Eosinophils 0.2 0.0 - 0.7 10e9/L    Absolute Basophils 0.0 0.0 - 0.2 10e9/L   TSH   Result Value Ref Range    TSH 2.18 0.40 - 4.00 mU/L       Thank you very much for choosing [facility]. Please call my office if you have any questions or concerns.       Sincerely,        ROCKY RUANO MD

## 2017-07-21 NOTE — LETTER
"      [facility]  [address] [place] [address]  [address]  [facility] [id number] [phone]                                                                                                           Clemente Mancilla  [id number]  [facility] [address]    July 25, 2017      Dear Avery Cornejo is a copy of the results.     Low vitamin D level   KEEP ON VITAMIN D   NORMAL TOTAL CHOLESTEROL   NORMAL TRIGLYCERIDES   BORDERLINE  HDL OR \"GOOD\" CHOLESTEROL   BORDERLINE  LDL OR \"BAD\" CHOLESTEROL   BORDERLINE  VERY LOW DENSITY CHOLESTEROL   NORMAL LIVER FUNCTION TEST   NORMAL THYROID STIMULATING HORMONE TEST   BORDERLINE  CHLORIDE LEVEL   BORDERLINE  GLUCOSE   NORMAL RENAL FUNCTION AND BLOOD SALTS   Results for orders placed or performed in visit on 07/21/17   Basic metabolic panel   Result Value Ref Range    Sodium 142 133 - 144 mmol/L    Potassium 3.9 3.4 - 5.3 mmol/L    Chloride 111 (H) 94 - 109 mmol/L    Carbon Dioxide 25 20 - 32 mmol/L    Anion Gap 6 3 - 14 mmol/L    Glucose 120 (H) 70 - 99 mg/dL    Urea Nitrogen 11 7 - 30 mg/dL    Creatinine 0.65 0.52 - 1.04 mg/dL    GFR Estimate >90  Non  GFR Calc   >60 mL/min/1.7m2    GFR Estimate If Black >90   GFR Calc   >60 mL/min/1.7m2    Calcium 9.0 8.5 - 10.1 mg/dL   Lipid panel reflex to direct LDL   Result Value Ref Range    Cholesterol 188 <200 mg/dL    Triglycerides 86 <150 mg/dL    HDL Cholesterol 46 (L) >49 mg/dL    LDL Cholesterol Calculated 125 (H) <100 mg/dL    Non HDL Cholesterol 142 (H) <130 mg/dL   Hemoglobin A1c   Result Value Ref Range    Hemoglobin A1C 5.8 4.3 - 6.0 %   ALT   Result Value Ref Range    ALT 23 0 - 50 U/L   CBC with platelets differential   Result Value Ref Range    WBC 4.7 4.0 - 11.0 10e9/L    RBC Count 4.46 3.8 - 5.2 10e12/L    Hemoglobin 13.6 11.7 - 15.7 g/dL    Hematocrit 40.6 35.0 - 47.0 %    MCV 91 78 - 100 fl    MCH 30.5 26.5 - 33.0 pg    MCHC 33.5 31.5 - 36.5 g/dL    " RDW 13.5 10.0 - 15.0 %    Platelet Count 208 150 - 450 10e9/L    Diff Method Automated Method     % Neutrophils 50.9 %    % Lymphocytes 38.7 %    % Monocytes 5.3 %    % Eosinophils 4.7 %    % Basophils 0.4 %    Absolute Neutrophil 2.4 1.6 - 8.3 10e9/L    Absolute Lymphocytes 1.8 0.8 - 5.3 10e9/L    Absolute Monocytes 0.3 0.0 - 1.3 10e9/L    Absolute Eosinophils 0.2 0.0 - 0.7 10e9/L    Absolute Basophils 0.0 0.0 - 0.2 10e9/L   Vitamin D Deficiency   Result Value Ref Range    Vitamin D Deficiency screening 18 (L) 20 - 75 ug/L   TSH   Result Value Ref Range    TSH 2.18 0.40 - 4.00 mU/L       Thank you for choosing [facility].  We appreciate the opportunity to serve you and look forward to supporting your healthcare needs in the future.    If you have any questions or concerns, please call me or my staff at [phone].      Sincerely,    William Cook Jr MD

## 2017-07-21 NOTE — PATIENT INSTRUCTIONS
(E11.9,  Z79.4) Controlled type 2 diabetes mellitus without complication, with long-term current use of insulin (H)  (primary encounter diagnosis)  Comment:    Plan: Basic metabolic panel, Albumin Random Urine         Quantitative, Hemoglobin A1c, TSH             (E78.5) Hyperlipidemia with target LDL less than 100  Comment:    Plan: Lipid panel reflex to direct LDL, ALT             (F32.5) Major depression in complete remission (H)  Comment:    Plan:      (M17.10) Osteoarthrosis involving lower leg  Comment:    Plan: meloxicam (MOBIC) 15 MG tablet             (M54.2) Cervicalgia  Comment:    Plan:      (M54.5,  G89.29) Chronic low back pain without sciatica, unspecified back pain laterality  Comment:    Plan:      (K75.81) KOHLER (nonalcoholic steatohepatitis)/US of 6-12  Comment:    Plan:      (E66.01) Morbid obesity due to excess calories (H)  Comment:    Plan:      (R76.11) Latent tuberculosis by blood test: 6-12 see letter from [facility] Pt refusing Rx   Comment:    Plan:      (E55.9) Vitamin D deficiency  Comment:    Plan: Vitamin D Deficiency             (K21.9) Gastroesophageal reflux disease without esophagitis  Comment:    Plan: CBC with platelets differential             (R10.12) Left upper quadrant pain  Comment:    Plan: omeprazole 20 MG tablet             (R10.13) Abdominal pain, epigastric  Comment:    Plan: omeprazole 20 MG tablet             (H81.12) BPPV (benign paroxysmal positional vertigo), left  Comment:    Plan: AUDIOLOGY BALANCE REFERRAL

## 2017-07-21 NOTE — LETTER
My Depression Action Plan  Name: Clemente Mancilla   Date of Birth 1955  Date: 7/21/2017    My doctor: Payal Cook   My clinic: [address]  [facility] [id number] [phone]          GREEN    ZONE   Good Control    What it looks like:     Things are going generally well. You have normal up s and down s. You may even feel depressed from time to time, but bad moods usually last less than a day.   What you need to do:  1. Continue to care for yourself (see self care plan)  2. Check your depression survival kit and update it as needed  3. Follow your physician s recommendations including any medication.  4. Do not stop taking medication unless you consult with your physician first.           YELLOW         ZONE Getting Worse    What it looks like:     Depression is starting to interfere with your life.     It may be hard to get out of bed; you may be starting to isolate yourself from others.    Symptoms of depression are starting to last most all day and this has happened for several days.     You may have suicidal thoughts but they are not constant.   What you need to do:     1. Call your care team, your response to treatment will improve if you keep your care team informed of your progress. Yellow periods are signs an adjustment may need to be made.     2. Continue your self-care, even if you have to fake it!    3. Talk to someone in your support network    4. Open up your depression survival kit           RED    ZONE Medical Alert - Get Help    What it looks like:     Depression is seriously interfering with your life.     You may experience these or other symptoms: You can t get out of bed most days, can t work or engage in other necessary activities, you have trouble taking care of basic hygiene, or basic responsibilities, thoughts of suicide or death that will not go away, self-injurious behavior.     What you need  to do:  1. Call your care team and request a same-day appointment. If they are not available (weekends or after hours) call your local crisis line, emergency room or 911.      Electronically signed by: Breonna Dumas, July 21, 2017    Depression Self Care Plan / Survival Kit    Self-Care for Depression  Here s the deal. Your body and mind are really not as separate as most people think.  What you do and think affects how you feel and how you feel influences what you do and think. This means if you do things that people who feel good do, it will help you feel better.  Sometimes this is all it takes.  There is also a place for medication and therapy depending on how severe your depression is, so be sure to consult with your medical provider and/ or Behavioral Health Consultant if your symptoms are worsening or not improving.     In order to better manage my stress, I will:    Exercise  Get some form of exercise, every day. This will help reduce pain and release endorphins, the  feel good  chemicals in your brain. This is almost as good as taking antidepressants!  This is not the same as joining a gym and then never going! (they count on that by the way ) It can be as simple as just going for a walk or doing some gardening, anything that will get you moving.      Hygiene   Maintain good hygiene (Get out of bed in the morning, Make your bed, Brush your teeth, Take a shower, and Get dressed like you were going to work, even if you are unemployed).  If your clothes don't fit try to get ones that do.    Diet  I will strive to eat foods that are good for me, drink plenty of water, and avoid excessive sugar, caffeine, alcohol, and other mood-altering substances.  Some foods that are helpful in depression are: complex carbohydrates, B vitamins, flaxseed, fish or fish oil, fresh fruits and vegetables.    Psychotherapy  I agree to participate in Individual Therapy (if recommended).    Medication  If prescribed medications, I  agree to take them.  Missing doses can result in serious side effects.  I understand that drinking alcohol, or other illicit drug use, may cause potential side effects.  I will not stop my medication abruptly without first discussing it with my provider.    Staying Connected With Others  I will stay in touch with my friends, family members, and my primary care provider/team.    Use your imagination  Be creative.  We all have a creative side; it doesn t matter if it s oil painting, sand castles, or mud pies! This will also kick up the endorphins.    Witness Beauty  (AKA stop and smell the roses) Take a look outside, even in mid-winter. Notice colors, textures. Watch the squirrels and birds.     Service to others  Be of service to others.  There is always someone else in need.  By helping others we can  get out of ourselves  and remember the really important things.  This also provides opportunities for practicing all the other parts of the program.    Humor  Laugh and be silly!  Adjust your TV habits for less news and crime-drama and more comedy.    Control your stress  Try breathing deep, massage therapy, biofeedback, and meditation. Find time to relax each day.     My support system    Clinic Contact:  Phone number:    Contact 1:  Phone number:    Contact 2:  Phone number:    Shinto/:  Phone number:    Therapist:  Phone number:    Local crisis center:    Phone number:    Other community support:  Phone number:

## 2017-07-21 NOTE — LETTER
"      Clemente Mancilla  [id number]  [facility] [address]        July 25, 2017          Dear ,    We are writing to inform you of your test results.    Low vitamin D level   KEEP ON VITAMIN D   NORMAL TOTAL CHOLESTEROL   NORMAL TRIGLYCERIDES   BORDERLINE  HDL OR \"GOOD\" CHOLESTEROL   BORDERLINE  LDL OR \"BAD\" CHOLESTEROL   BORDERLINE  VERY LOW DENSITY CHOLESTEROL   NORMAL LIVER FUNCTION TEST   NORMAL THYROID STIMULATING HORMONE TEST   BORDERLINE  CHLORIDE LEVEL   BORDERLINE  GLUCOSE   NORMAL RENAL FUNCTION AND BLOOD SALTS     Resulted Orders   Basic metabolic panel   Result Value Ref Range    Sodium 142 133 - 144 mmol/L    Potassium 3.9 3.4 - 5.3 mmol/L    Chloride 111 (H) 94 - 109 mmol/L    Carbon Dioxide 25 20 - 32 mmol/L    Anion Gap 6 3 - 14 mmol/L    Glucose 120 (H) 70 - 99 mg/dL      Comment:      Non Fasting    Urea Nitrogen 11 7 - 30 mg/dL    Creatinine 0.65 0.52 - 1.04 mg/dL    GFR Estimate >90  Non  GFR Calc   >60 mL/min/1.7m2    GFR Estimate If Black >90   GFR Calc   >60 mL/min/1.7m2    Calcium 9.0 8.5 - 10.1 mg/dL   Lipid panel reflex to direct LDL   Result Value Ref Range    Cholesterol 188 <200 mg/dL    Triglycerides 86 <150 mg/dL      Comment:      Non Fasting    HDL Cholesterol 46 (L) >49 mg/dL    LDL Cholesterol Calculated 125 (H) <100 mg/dL      Comment:      Above desirable:  100-129 mg/dl   Borderline High:  130-159 mg/dL   High:             160-189 mg/dL   Very high:       >189 mg/dl      Non HDL Cholesterol 142 (H) <130 mg/dL      Comment:      Above Desirable:  130-159 mg/dl   Borderline high:  160-189 mg/dl   High:             190-219 mg/dl   Very high:       >219 mg/dl     Hemoglobin A1c   Result Value Ref Range    Hemoglobin A1C 5.8 4.3 - 6.0 %   ALT   Result Value Ref Range    ALT 23 0 - 50 U/L   CBC with platelets differential   Result Value Ref Range    WBC 4.7 4.0 - 11.0 10e9/L    RBC Count 4.46 3.8 - 5.2 10e12/L    Hemoglobin 13.6 11.7 - 15.7 " g/dL    Hematocrit 40.6 35.0 - 47.0 %    MCV 91 78 - 100 fl    MCH 30.5 26.5 - 33.0 pg    MCHC 33.5 31.5 - 36.5 g/dL    RDW 13.5 10.0 - 15.0 %    Platelet Count 208 150 - 450 10e9/L    Diff Method Automated Method     % Neutrophils 50.9 %    % Lymphocytes 38.7 %    % Monocytes 5.3 %    % Eosinophils 4.7 %    % Basophils 0.4 %    Absolute Neutrophil 2.4 1.6 - 8.3 10e9/L    Absolute Lymphocytes 1.8 0.8 - 5.3 10e9/L    Absolute Monocytes 0.3 0.0 - 1.3 10e9/L    Absolute Eosinophils 0.2 0.0 - 0.7 10e9/L    Absolute Basophils 0.0 0.0 - 0.2 10e9/L   Vitamin D Deficiency   Result Value Ref Range    Vitamin D Deficiency screening 18 (L) 20 - 75 ug/L      Comment:      Season, race, dietary intake, and treatment affect the concentration of   25-hydroxy-Vitamin D. Values may decrease during winter months and increase   during summer months. Values 20-29 ug/L may indicate Vitamin D insufficiency   and values <20 ug/L may indicate Vitamin D deficiency.   Vitamin D determination is routinely performed by an immunoassay specific for   25 hydroxyvitamin D3.  If an individual is on vitamin D2 (ergocalciferol)   supplementation, please specify 25 OH vitamin D2 and D3 level determination   by   LCMSMS test VITD23.     TSH   Result Value Ref Range    TSH 2.18 0.40 - 4.00 mU/L       If you have any questions or concerns, please call the clinic at the number listed above.       Sincerely,        ROCKY RUANO MD

## 2017-07-22 LAB
ALT SERPL W P-5'-P-CCNC: 23 U/L (ref 0–50)
ANION GAP SERPL CALCULATED.3IONS-SCNC: 6 MMOL/L (ref 3–14)
BUN SERPL-MCNC: 11 MG/DL (ref 7–30)
CALCIUM SERPL-MCNC: 9 MG/DL (ref 8.5–10.1)
CHLORIDE SERPL-SCNC: 111 MMOL/L (ref 94–109)
CHOLEST SERPL-MCNC: 188 MG/DL
CO2 SERPL-SCNC: 25 MMOL/L (ref 20–32)
CREAT SERPL-MCNC: 0.65 MG/DL (ref 0.52–1.04)
GFR SERPL CREATININE-BSD FRML MDRD: ABNORMAL ML/MIN/1.7M2
GLUCOSE SERPL-MCNC: 120 MG/DL (ref 70–99)
HDLC SERPL-MCNC: 46 MG/DL
LDLC SERPL CALC-MCNC: 125 MG/DL
NONHDLC SERPL-MCNC: 142 MG/DL
POTASSIUM SERPL-SCNC: 3.9 MMOL/L (ref 3.4–5.3)
SODIUM SERPL-SCNC: 142 MMOL/L (ref 133–144)
TRIGL SERPL-MCNC: 86 MG/DL
TSH SERPL DL<=0.05 MIU/L-ACNC: 2.18 MU/L (ref 0.4–4)

## 2017-07-22 ASSESSMENT — PATIENT HEALTH QUESTIONNAIRE - PHQ9: SUM OF ALL RESPONSES TO PHQ QUESTIONS 1-9: 0

## 2017-07-22 NOTE — PROGRESS NOTES
"Please send normal lab letter when labs are complete  NORMAL TOTAL CHOLESTEROL   NORMAL TRIGLYCERIDES   BORDERLINE  HDL OR \"GOOD\" CHOLESTEROL   BORDERLINE  LDL OR \"BAD\" CHOLESTEROL   BORDERLINE  VERY LOW DENSITY CHOLESTEROL   NORMAL LIVER FUNCTION TEST   NORMAL THYROID STIMULATING HORMONE TEST   BORDERLINE  CHLORIDE LEVEL   BORDERLINE  GLUCOSE  NORMAL RENAL FUNCTION AND BLOOD SALTS   ROCKY RUANO JR., MD"

## 2017-07-24 LAB — DEPRECATED CALCIDIOL+CALCIFEROL SERPL-MC: 18 UG/L (ref 20–75)

## 2017-07-25 NOTE — PROGRESS NOTES
"Please send normal lab letter when labs are complete   Low vitamin D level   KEEP ON VITAMIN D   NORMAL TOTAL CHOLESTEROL   NORMAL TRIGLYCERIDES   BORDERLINE  HDL OR \"GOOD\" CHOLESTEROL   BORDERLINE  LDL OR \"BAD\" CHOLESTEROL   BORDERLINE  VERY LOW DENSITY CHOLESTEROL   NORMAL LIVER FUNCTION TEST   NORMAL THYROID STIMULATING HORMONE TEST   BORDERLINE  CHLORIDE LEVEL   BORDERLINE  GLUCOSE  NORMAL RENAL FUNCTION AND BLOOD SALTS   ROCKY RUANO JR., MD  "

## 2017-07-29 ENCOUNTER — HEALTH MAINTENANCE LETTER (OUTPATIENT)
Age: 62
End: 2017-07-29

## 2017-08-24 RX ORDER — MELOXICAM 15 MG/1
TABLET ORAL
Qty: 30 TABLET | Refills: 5 | Status: SHIPPED | OUTPATIENT
Start: 2017-08-24

## 2017-08-24 NOTE — TELEPHONE ENCOUNTER
MELOXICAM 15 MG TABLET      Last Written Prescription Date: 07/21/17  Last Fill Quantity: 30,  # refills: 1   Last Office Visit with [facility], [facility] or [facility] prescribing provider: 07/21/17

## 2017-08-24 NOTE — TELEPHONE ENCOUNTER
Creatinine   Date Value Ref Range Status   07/21/2017 0.65 0.52 - 1.04 mg/dL Final     Lab Results   Component Value Date    AST 18.0 06/06/2012     Lab Results   Component Value Date    ALT 23 07/21/2017     BP Readings from Last 3 Encounters:   07/21/17 112/62   11/06/15 112/68   10/02/15 110/62           Prescription approved per [facility] Refill Protocol.  Terri Vaughn RN- Triage [employer]

## 2018-01-02 ENCOUNTER — TELEPHONE (OUTPATIENT)
Dept: FAMILY MEDICINE | Facility: CLINIC | Age: 63
End: 2018-01-02

## 2018-01-02 NOTE — LETTER
January 2, 2018    Clemente Mancilla  [address]  [facility] [address]    Dear Piotr Cornejo cares about your health and your health plan.  I have reviewed your medical conditions, medication list and lab results, and am making recommendations based on this review to better manage your health.    You are in particular need of attention regarding:  -Diabetes  -Breast Cancer Screening  -Colon Cancer Screening  -Cervical Cancer Screening    I am recommending that you:     -schedule a FOLLOWUP OFFICE APPOINTMENT with me.  I will recheck your: A1c tests.    -schedule a MAMMOGRAM which is due.    1 in 8 women will develop invasive breast cancer during her lifetime and it is the most common non-skin cancer in American women.  EARLY detection, new treatments, and a better understanding of the disease have increased survival rates - the 5 year survival rate in the 1960s was 63% and today it is close to 90%.    If you are under/uninsured, we recommend you contact the [facility]. They offer mammograms at no charge or on a sliding fee charge. You can schedule with them at [phone]. Please have them send us the results.      Please disregard this reminder if you have had this exam elsewhere within the last year.  It would be helpful for us to have a copy of your mammogram report in your file so that we can best coordinate your care - please contact us with when your test was done so we can update your record.             -schedule a COLONOSCOPY to look for colon cancer (due every 10 years or 5 years in higher risk situations.)        Colon cancer is now the second leading cause of cancer-related deaths in the [place] for both men and women and there are over 130,000 new cases and 50,000 deaths per year from colon cancer.  Colonoscopies can prevent 90-95% of these deaths.  Problem lesions can be removed before they ever become cancer.  This test is not only looking for cancer, but also getting rid of  precancerious lesions.    If you are under/uninsured, we recommend you contact the [facility] program. [facility] is a free colorectal cancer screening program that provides colonoscopies for eligible under/uninsured [place] men and women. If you are interested in receiving a free colonoscopy, please call [facility] at [phone] (mention code [facility]) to see if you re eligible.      If you do not wish to do a colonoscopy or cannot afford to do one, at this time, there is another option. It is called a FIT test or Fecal Immunochemical Occult Blood Test (take home stool sample kit).  It does not replace the colonoscopy for colorectal cancer screening, but it can detect hidden bleeding in the lower colon.  It does need to be repeated every year and if a positive result is obtained, you would be referred for a colonoscopy.          If you have completed either one of these tests at another facility, please call with the details of when and where the tests were done and if they were normal or not. Or have the records sent to our clinic so that we can best coordinate your care.    -schedule a PAP SMEAR EXAM which is due.  Please disregard this reminder if you have had this exam elsewhere within the last year.  It would be helpful for us to have a copy of your recent pap smear report in our file so that we can best coordinate your care.    If you are under/uninsured, we recommend you contact the [facility]. They offer pap smears at no charge or on a sliding fee charge. You can schedule with them at [phone]. Please have them send us the results.      Please call us at the [place] location:  [phone] or use [employer] to address the above recommendations.     Thank you for trusting [facility].  We appreciate the opportunity to serve you and look forward to supporting your healthcare in the future.    If you have (or plan to have) any of these tests done at a facility other than a [facility] or  a [facility], please have the results sent to the [facility] location noted above.      Best Regards,    Payal Cook MD

## 2018-01-02 NOTE — TELEPHONE ENCOUNTER
Panel Management Review      Patient has the following on her problem list:     Depression / Dysthymia review    Measure:  Needs PHQ-9 score of 4 or less during index window.  Administer PHQ-9 and if score is 5 or more, send encounter to provider for next steps.    5 - 7 month window range:     PHQ-9 SCORE 4/18/2014 9/18/2015 7/21/2017   Total Score 0 - -   Total Score - 9 0       If PHQ-9 recheck is 5 or more, route to provider for next steps.    Patient is due for:  PHQ9    Diabetes    ASA: Failed    Last A1C  Lab Results   Component Value Date    A1C 5.8 07/21/2017    A1C 5.6 09/18/2015    A1C 6.2 06/06/2012    A1C 6.0 02/02/2012    A1C 6.1 05/09/2011     A1C tested: Passed    Last LDL:    Lab Results   Component Value Date    CHOL 188 07/21/2017     Lab Results   Component Value Date    HDL 46 07/21/2017     Lab Results   Component Value Date     07/21/2017     Lab Results   Component Value Date    TRIG 86 07/21/2017     Lab Results   Component Value Date    CHOLHDLRATIO 3.5 09/18/2015     Lab Results   Component Value Date    NHDL 142 07/21/2017       Is the patient on a Statin? NO             Is the patient on Aspirin? NO        Last three blood pressure readings:  BP Readings from Last 3 Encounters:   07/21/17 112/62   11/06/15 112/68   10/02/15 110/62       Date of last diabetes office visit: 7/21/2017     Tobacco History:     History   Smoking Status     Never Smoker   Smokeless Tobacco     Never Used               Composite cancer screening  Chart review shows that this patient is due/due soon for the following Pap Smear, Mammogram and Colonoscopy  Summary:    Patient is due/failing the following:   A1C, FIT, MAMMOGRAM and PAP    Action needed:   Patient needs office visit for physical, mammo, FIT, PAP.    Type of outreach:    Sent letter.    Questions for provider review:    None                                                                                                                                     Breonna Dumas CMA

## 2018-02-05 ENCOUNTER — OFFICE VISIT (OUTPATIENT)
Dept: FAMILY MEDICINE | Facility: CLINIC | Age: 63
End: 2018-02-05
Payer: COMMERCIAL

## 2018-02-05 VITALS
SYSTOLIC BLOOD PRESSURE: 132 MMHG | WEIGHT: 190.5 LBS | HEIGHT: 62 IN | RESPIRATION RATE: 16 BRPM | TEMPERATURE: 98.2 F | OXYGEN SATURATION: 99 % | DIASTOLIC BLOOD PRESSURE: 76 MMHG | BODY MASS INDEX: 35.06 KG/M2 | HEART RATE: 74 BPM

## 2018-02-05 DIAGNOSIS — F32.5 MAJOR DEPRESSION IN COMPLETE REMISSION (H): Chronic | ICD-10-CM

## 2018-02-05 DIAGNOSIS — R10.12 LEFT UPPER QUADRANT PAIN: ICD-10-CM

## 2018-02-05 DIAGNOSIS — F41.1 GENERALIZED ANXIETY DISORDER: ICD-10-CM

## 2018-02-05 DIAGNOSIS — M17.0 PRIMARY OSTEOARTHRITIS OF BOTH KNEES: ICD-10-CM

## 2018-02-05 DIAGNOSIS — M25.562 ARTHRALGIA OF BOTH KNEES: ICD-10-CM

## 2018-02-05 DIAGNOSIS — M25.561 ARTHRALGIA OF BOTH KNEES: ICD-10-CM

## 2018-02-05 DIAGNOSIS — R00.2 PALPITATIONS: ICD-10-CM

## 2018-02-05 DIAGNOSIS — H90.3 BILATERAL SENSORINEURAL HEARING LOSS: ICD-10-CM

## 2018-02-05 DIAGNOSIS — M13.0 POLYARTHROPATHY OR POLYARTHRITIS, HAND: ICD-10-CM

## 2018-02-05 DIAGNOSIS — E78.5 HYPERLIPIDEMIA LDL GOAL <100: ICD-10-CM

## 2018-02-05 DIAGNOSIS — I10 HYPERTENSION GOAL BP (BLOOD PRESSURE) < 130/80: ICD-10-CM

## 2018-02-05 DIAGNOSIS — E55.9 VITAMIN D DEFICIENCY: Chronic | ICD-10-CM

## 2018-02-05 DIAGNOSIS — G44.229 CHRONIC TENSION-TYPE HEADACHE, NOT INTRACTABLE: ICD-10-CM

## 2018-02-05 DIAGNOSIS — K21.9 GASTROESOPHAGEAL REFLUX DISEASE WITHOUT ESOPHAGITIS: Chronic | ICD-10-CM

## 2018-02-05 DIAGNOSIS — F51.01 PRIMARY INSOMNIA: ICD-10-CM

## 2018-02-05 DIAGNOSIS — E11.9 TYPE 2 DIABETES MELLITUS WITHOUT COMPLICATION, UNSPECIFIED LONG TERM INSULIN USE STATUS: ICD-10-CM

## 2018-02-05 DIAGNOSIS — M54.2 CERVICALGIA: ICD-10-CM

## 2018-02-05 DIAGNOSIS — H93.13 TINNITUS, BILATERAL: ICD-10-CM

## 2018-02-05 DIAGNOSIS — E11.9 CONTROLLED TYPE 2 DIABETES MELLITUS WITHOUT COMPLICATION, WITH LONG-TERM CURRENT USE OF INSULIN (H): Primary | ICD-10-CM

## 2018-02-05 DIAGNOSIS — K04.7 DENTAL INFECTION: ICD-10-CM

## 2018-02-05 DIAGNOSIS — G43.019 INTRACTABLE MIGRAINE WITHOUT AURA AND WITHOUT STATUS MIGRAINOSUS: ICD-10-CM

## 2018-02-05 DIAGNOSIS — R10.13 ABDOMINAL PAIN, EPIGASTRIC: ICD-10-CM

## 2018-02-05 DIAGNOSIS — K75.81 NASH (NONALCOHOLIC STEATOHEPATITIS): ICD-10-CM

## 2018-02-05 DIAGNOSIS — Z79.4 CONTROLLED TYPE 2 DIABETES MELLITUS WITHOUT COMPLICATION, WITH LONG-TERM CURRENT USE OF INSULIN (H): Primary | ICD-10-CM

## 2018-02-05 LAB
BASOPHILS # BLD AUTO: 0 10E9/L (ref 0–0.2)
BASOPHILS NFR BLD AUTO: 0.4 %
DIFFERENTIAL METHOD BLD: NORMAL
EOSINOPHIL # BLD AUTO: 0.2 10E9/L (ref 0–0.7)
EOSINOPHIL NFR BLD AUTO: 4.2 %
ERYTHROCYTE [DISTWIDTH] IN BLOOD BY AUTOMATED COUNT: 13.3 % (ref 10–15)
HBA1C MFR BLD: 5.8 % (ref 4.3–6)
HCT VFR BLD AUTO: 40.6 % (ref 35–47)
HGB BLD-MCNC: 13.7 G/DL (ref 11.7–15.7)
LYMPHOCYTES # BLD AUTO: 1.8 10E9/L (ref 0.8–5.3)
LYMPHOCYTES NFR BLD AUTO: 38.3 %
MCH RBC QN AUTO: 30.6 PG (ref 26.5–33)
MCHC RBC AUTO-ENTMCNC: 33.7 G/DL (ref 31.5–36.5)
MCV RBC AUTO: 91 FL (ref 78–100)
MONOCYTES # BLD AUTO: 0.3 10E9/L (ref 0–1.3)
MONOCYTES NFR BLD AUTO: 6.6 %
NEUTROPHILS # BLD AUTO: 2.4 10E9/L (ref 1.6–8.3)
NEUTROPHILS NFR BLD AUTO: 50.5 %
PLATELET # BLD AUTO: 195 10E9/L (ref 150–450)
RBC # BLD AUTO: 4.47 10E12/L (ref 3.8–5.2)
WBC # BLD AUTO: 4.7 10E9/L (ref 4–11)

## 2018-02-05 PROCEDURE — 83036 HEMOGLOBIN GLYCOSYLATED A1C: CPT | Performed by: FAMILY MEDICINE

## 2018-02-05 PROCEDURE — 85025 COMPLETE CBC W/AUTO DIFF WBC: CPT | Performed by: FAMILY MEDICINE

## 2018-02-05 PROCEDURE — 99214 OFFICE O/P EST MOD 30 MIN: CPT | Performed by: FAMILY MEDICINE

## 2018-02-05 PROCEDURE — 82043 UR ALBUMIN QUANTITATIVE: CPT | Performed by: FAMILY MEDICINE

## 2018-02-05 PROCEDURE — 36415 COLL VENOUS BLD VENIPUNCTURE: CPT | Performed by: FAMILY MEDICINE

## 2018-02-05 PROCEDURE — 80061 LIPID PANEL: CPT | Performed by: FAMILY MEDICINE

## 2018-02-05 PROCEDURE — 80076 HEPATIC FUNCTION PANEL: CPT | Performed by: FAMILY MEDICINE

## 2018-02-05 PROCEDURE — 80048 BASIC METABOLIC PNL TOTAL CA: CPT | Performed by: FAMILY MEDICINE

## 2018-02-05 RX ORDER — ALUMINA, MAGNESIA, AND SIMETHICONE 2400; 2400; 240 MG/30ML; MG/30ML; MG/30ML
30 SUSPENSION ORAL 4 TIMES DAILY PRN
Qty: 1 BOTTLE | Refills: 11 | Status: SHIPPED | OUTPATIENT
Start: 2018-02-05

## 2018-02-05 RX ORDER — NICOTINE POLACRILEX 4 MG/1
40 GUM, CHEWING ORAL DAILY
Qty: 60 TABLET | Refills: 11 | Status: SHIPPED | OUTPATIENT
Start: 2018-02-05

## 2018-02-05 RX ORDER — BLOOD-GLUCOSE CONTROL, NORMAL
EACH MISCELLANEOUS
Qty: 1 EACH | Refills: 11 | Status: SHIPPED | OUTPATIENT
Start: 2018-02-05

## 2018-02-05 RX ORDER — ACETAMINOPHEN 500 MG
500-1000 TABLET ORAL EVERY 6 HOURS PRN
Qty: 120 TABLET | Refills: 11 | Status: SHIPPED | OUTPATIENT
Start: 2018-02-05

## 2018-02-05 RX ORDER — LANCING DEVICE
EACH MISCELLANEOUS
Qty: 1 EACH | Refills: 0 | Status: SHIPPED | OUTPATIENT
Start: 2018-02-05

## 2018-02-05 RX ORDER — LANCING DEVICE
EACH MISCELLANEOUS
Qty: 1 EACH | Refills: 1 | Status: SHIPPED | OUTPATIENT
Start: 2018-02-05

## 2018-02-05 NOTE — MR AVS SNAPSHOT
After Visit Summary   2/5/2018    Clemente Mancilla    MRN: [id number]           Patient Information     Date Of Birth          1955        Visit Information        Provider Department      2/5/2018 11:00 AM William Cook MD; [employer] [facility]        Today's Diagnoses     Controlled type 2 diabetes mellitus without complication, with long-term current use of insulin (H)    -  1    Type 2 diabetes mellitus without complication, unspecified long term insulin use status (H)        Left upper quadrant pain        Abdominal pain, epigastric        Arthralgia of both knees        Vitamin D deficiency        Osteoarthrosis involving lower leg        Tinnitus, bilateral        Bilateral sensorineural hearing loss        Chronic tension-type headache, not intractable        Dental infection        Primary insomnia        Primary osteoarthritis of both knees        Polyarthropathy or polyarthritis, hand        Intractable migraine without aura and without status migrainosus        Cervicalgia        KOHLER (nonalcoholic steatohepatitis)/US of 6-12        Hypertension goal BP (blood pressure) < 130/80        Major depression in complete remission (H)        Gastroesophageal reflux disease without esophagitis        Generalized anxiety disorder        Palpitations          Care Instructions    (E11.9,  Z79.4) Controlled type 2 diabetes mellitus without complication, with long-term current use of insulin (H)  (primary encounter diagnosis)  Comment:    Plan: CBC with platelets differential, Hemoglobin         A1c, Basic metabolic panel, Lipid panel reflex         to direct LDL Fasting, Albumin Random Urine         Quantitative with Creat Ratio, blood glucose         calibration (NO BRAND SPECIFIED) solution,         blood glucose (NO BRAND SPECIFIED) lancing         device, blood glucose monitoring (NO BRAND         SPECIFIED) meter device kit, blood  glucose         monitoring (NO BRAND SPECIFIED) test strip,         blood glucose (NO BRAND SPECIFIED) lancing         device, CANCELED: ALT             (E11.9) Type 2 diabetes mellitus without complication, unspecified long term insulin use status (H)  Comment:    Plan: metFORMIN (GLUCOPHAGE) 500 MG tablet, CBC with         platelets differential, Hemoglobin A1c, Basic         metabolic panel, Lipid panel reflex to direct         LDL Fasting, Albumin Random Urine Quantitative         with Creat Ratio, blood glucose monitoring (NO         BRAND SPECIFIED) test strip, CANCELED: ALT             (R10.12) Left upper quadrant pain  Comment:    Plan: alum & mag hydroxide-simethicone (MYLANTA         ES/MAALOX  ES) 400-400-40 MG/5ML SUSP         suspension, omeprazole 20 MG tablet             (R10.13) Abdominal pain, epigastric  Comment:    Plan: alum & mag hydroxide-simethicone (MYLANTA         ES/MAALOX  ES) 400-400-40 MG/5ML SUSP         suspension, omeprazole 20 MG tablet             (M25.561,  M25.562) Arthralgia of both knees  Comment:    Plan: acetaminophen (TYLENOL) 500 MG tablet             (E55.9) Vitamin D deficiency  Comment:    Plan: cholecalciferol (VITAMIN D3) 53050 UNITS         capsule             (M17.10) Osteoarthrosis involving lower leg  Comment:    Plan:      (H93.13) Tinnitus, bilateral  Comment:    Plan: OTOLARYNGOLOGY REFERRAL             (H90.3) Bilateral sensorineural hearing loss  Comment:    Plan: OTOLARYNGOLOGY REFERRAL             (G44.229) Chronic tension-type headache, not intractable  Comment: 2 months   Plan:      (K04.7) Dental infection  Comment:     Plan:         (F51.01) Primary insomnia  Comment:    Plan:      (M17.0) Primary osteoarthritis of both knees  Comment:    Plan:      (M13.0) Polyarthropathy or polyarthritis, hand  Comment:    Plan:      (G43.019) Intractable migraine without aura and without status migrainosus  Comment:    Plan:      (M54.2) Cervicalgia  Comment:    Plan:       (K75.81) KOHLER (nonalcoholic steatohepatitis)/US of 6-12  Comment:    Plan: Hepatic panel (Albumin, ALT, AST, Bili, Alk         Phos, TP)             (I10) Hypertension goal BP (blood pressure) < 130/80  Comment:    Plan:      (F32.5) Major depression in complete remission (H)  Comment:    Plan:      (K21.9) Gastroesophageal reflux disease without esophagitis  Comment:    Plan:      (F41.1) Generalized anxiety disorder  Comment:    Plan:      (R00.2) Palpitations  Comment:    Plan:                 Follow-ups after your visit        Additional Services     OTOLARYNGOLOGY REFERRAL       Your provider has referred you to: [facility] [phone]   [email or web address]/[email or web address]/  Hearing loss and tinnitus     Please be aware that coverage of these services is subject to the terms and limitations of your health insurance plan.  Call member services at your health plan with any benefit or coverage questions.      Please bring the following with you to your appointment:    (1) Any X-Rays, CTs or MRIs which have been performed.  Contact the facility where they were done to arrange for  prior to your scheduled appointment.   (2) List of current medications  (3) This referral request   (4) Any documents/labs given to you for this referral                  Follow-up notes from your care team     Return in about 3 months (around 5/5/2018).      Who to contact     If you have questions or need follow up information about today's clinic visit or your schedule please contact [facility] directly at [phone].  Normal or non-critical lab and imaging results will be communicated to you by MyChart, letter or phone within 4 business days after the clinic has received the results. If you do not hear from us within 7 days, please contact the clinic through MyChart or phone. If you have a critical or abnormal  "lab result, we will notify you by phone as soon as possible.  Submit refill requests through [employer] or call your pharmacy and they will forward the refill request to us. Please allow 3 business days for your refill to be completed.          Additional Information About Your Visit        [employer] Information     [employer] lets you send messages to your doctor, view your test results, renew your prescriptions, schedule appointments and more. To sign up, go to [email or web address] . Click on \"Log in\" on the left side of the screen, which will take you to the Welcome page. Then click on \"Sign up Now\" on the right side of the page.     You will be asked to enter the access code listed below, as well as some personal information. Please follow the directions to create your username and password.     Your access code is: [email or web address]  Expires: 2018 12:19 PM     Your access code will  in 90 days. If you need help or a new code, please call your [facility] clinic or [phone].        Care EveryWhere ID     This is your Care EveryWhere ID. This could be used by other organizations to access your [facility] medical records  [id number]        Your Vitals Were     Pulse Temperature Respirations Height Pulse Oximetry BMI (Body Mass Index)    74 98.2  F (36.8  C) (Tympanic) 16 5' 2\" (1.575 m) 99% 34.84 kg/m2       Blood Pressure from Last 3 Encounters:   18 132/76   17 112/62   11/06/15 112/68    Weight from Last 3 Encounters:   18 190 lb 8 oz (86.4 kg)   17 182 lb (82.6 kg)   11/06/15 186 lb 3.2 oz (84.5 kg)              We Performed the Following     Albumin Random Urine Quantitative with Creat Ratio     Basic metabolic panel     CBC with platelets differential     Hemoglobin A1c     Hepatic panel (Albumin, ALT, AST, Bili, Alk Phos, TP)     Lipid panel reflex to direct LDL Fasting     OTOLARYNGOLOGY REFERRAL          Today's Medication Changes          These changes are accurate as of 18 " 12:19 PM.  If you have any questions, ask your nurse or doctor.               Start taking these medicines.        Dose/Directions    blood glucose calibration solution   Commonly known as:  no brand specified   Used for:  Controlled type 2 diabetes mellitus without complication, with long-term current use of insulin (H)   Started by:  William Cook MD        Use to calibrate blood glucose monitor as directed.   Quantity:  1 each   Refills:  11       * blood glucose lancing device   Commonly known as:  no brand specified   Used for:  Controlled type 2 diabetes mellitus without complication, with long-term current use of insulin (H)   Started by:  William Cook MD        Device to be used with lancets.   Quantity:  1 each   Refills:  1       * blood glucose lancing device   Commonly known as:  no brand specified   Used for:  Controlled type 2 diabetes mellitus without complication, with long-term current use of insulin (H)   Started by:  William Cook MD        Device to be used with lancets.   Quantity:  1 each   Refills:  0       blood glucose monitoring meter device kit   Commonly known as:  no brand specified   Used for:  Controlled type 2 diabetes mellitus without complication, with long-term current use of insulin (H)   Started by:  William Cook MD        Use to test blood sugar  1  times daily or as directed.   Quantity:  1 kit   Refills:  0       blood glucose monitoring test strip   Commonly known as:  no brand specified   Used for:  Controlled type 2 diabetes mellitus without complication, with long-term current use of insulin (H), Type 2 diabetes mellitus without complication, unspecified long term insulin use status (H)   Started by:  William Cook MD        Use to test blood sugars TWICE DAILY PRN  times daily or as directed   Quantity:  100 strip   Refills:  11       * Notice:  This list has 2 medication(s) that are the same as other medications  prescribed for you. Read the directions carefully, and ask your doctor or other care provider to review them with you.      These medicines have changed or have updated prescriptions.        Dose/Directions    metFORMIN 500 MG tablet   Commonly known as:  GLUCOPHAGE   This may have changed:  See the new instructions.   Used for:  Type 2 diabetes mellitus without complication, unspecified long term insulin use status (H)   Changed by:  William Cook MD        TAKE 1 TABLET (500 MG) BY MOUTH 2 TIMES DAILY (WITH MEALS)   Quantity:  60 tablet   Refills:  11            Where to get your medicines      These medications were sent to [facility] - [place], [place] - [address] [place] [address]  [address] [facility] [address] [address]     Phone:  [phone]     acetaminophen 500 MG tablet    alum & mag hydroxide-simethicone 400-400-40 MG/5ML Susp suspension    blood glucose calibration solution    blood glucose lancing device    blood glucose lancing device    blood glucose monitoring meter device kit    blood glucose monitoring test strip    cholecalciferol 72955 UNITS capsule    metFORMIN 500 MG tablet    omeprazole 20 MG tablet                Primary Care Provider Office Phone # Fax #    Payal Julianna Cook [phone] [address] [facility] [address]        Equal Access to Services     RICKY ARRINGTON AH: Hadii lázaro funez hadasho Soomaali, waaxda luqadaha, qaybta kaalmada adeegyada, jesu salguero. So [facility] [phone].    ATENCIÓN: Si habla español, tiene a morgan disposición servicios gratuitos de asistencia lingüística. Micheline al [phone].    We comply with applicable federal civil rights laws and [place] laws. We do not discriminate on the basis of race, color, national origin, age, disability, sex, sexual orientation, or gender identity.            Thank you!     Thank you for choosing [facility]  for your  care. Our goal is always to provide you with excellent care. Hearing back from our patients is one way we can continue to improve our services. Please take a few minutes to complete the written survey that you may receive in the mail after your visit with us. Thank you!             Your Updated Medication List - Protect others around you: Learn how to safely use, store and throw away your medicines at www.disposemymeds.org.          This list is accurate as of 2/5/18 12:19 PM.  Always use your most recent med list.                   Brand Name Dispense Instructions for use Diagnosis    acetaminophen 500 MG tablet    TYLENOL    120 tablet    Take 1-2 tablets (500-1,000 mg) by mouth every 6 hours as needed for mild pain    Arthralgia of both knees       alum & mag hydroxide-simethicone 400-400-40 MG/5ML Susp suspension    MYLANTA ES/MAALOX  ES    1 Bottle    Take 30 mLs by mouth 4 times daily as needed for indigestion    Left upper quadrant pain, Abdominal pain, epigastric       blood glucose calibration solution    no brand specified    1 each    Use to calibrate blood glucose monitor as directed.    Controlled type 2 diabetes mellitus without complication, with long-term current use of insulin (H)       * blood glucose lancing device    no brand specified    1 each    Device to be used with lancets.    Controlled type 2 diabetes mellitus without complication, with long-term current use of insulin (H)       * blood glucose lancing device    no brand specified    1 each    Device to be used with lancets.    Controlled type 2 diabetes mellitus without complication, with long-term current use of insulin (H)       blood glucose monitoring meter device kit    no brand specified    1 kit    Use to test blood sugar  1  times daily or as directed.    Controlled type 2 diabetes mellitus without complication, with long-term current use of insulin (H)       blood glucose monitoring test strip    no brand specified    100 strip     Use to test blood sugars TWICE DAILY PRN  times daily or as directed    Controlled type 2 diabetes mellitus without complication, with long-term current use of insulin (H), Type 2 diabetes mellitus without complication, unspecified long term insulin use status (H)       cholecalciferol 96367 UNITS capsule    VITAMIN D3    4 capsule    Take 1 capsule (50,000 Units) by mouth once a week    Vitamin D deficiency       Glucosamine Sulfate 750 MG Tabs     60 tablet    Take 2 tablets by mouth daily (with breakfast)    Primary osteoarthritis of both knees       lidocaine 5 % ointment    XYLOCAINE    142 g    One application 4 x daily to affected areas lower back and knees if necessary    Knee pain, unspecified laterality       meloxicam 15 MG tablet    MOBIC    30 tablet    Take 1 tablet (15 mg) by mouth daily    Osteoarthrosis involving lower leg       metFORMIN 500 MG tablet    GLUCOPHAGE    60 tablet    TAKE 1 TABLET (500 MG) BY MOUTH 2 TIMES DAILY (WITH MEALS)    Type 2 diabetes mellitus without complication, unspecified long term insulin use status (H)       omeprazole 20 MG tablet     60 tablet    Take 2 tablets (40 mg) by mouth daily Take 30-60 minutes before a meal.    Left upper quadrant pain, Abdominal pain, epigastric       order for DME     4 Device    Equipment being ordered:  ONE PAIR HEEL CUPS* ONE PAIR* APPLY DAILY TO FOOT WARE* TO PROTECT ACHILLES TENDONS*  FLESH COLORED BELOW THE KNEE STOCKINGS* 15-20 milimeters mercury  3 PAIRS* USE DAILY  WASH DAILY    Edema       traMADol-acetaminophen 37.5-325 MG per tablet    ULTRACET    120 tablet    Take 1 tablet by mouth every 6 hours as needed for moderate pain    Hip pain, right, Knee pain, unspecified laterality       * Notice:  This list has 2 medication(s) that are the same as other medications prescribed for you. Read the directions carefully, and ask your doctor or other care provider to review them with you.

## 2018-02-05 NOTE — PATIENT INSTRUCTIONS
(E11.9,  Z79.4) Controlled type 2 diabetes mellitus without complication, with long-term current use of insulin (H)  (primary encounter diagnosis)  Comment:    Plan: CBC with platelets differential, Hemoglobin         A1c, Basic metabolic panel, Lipid panel reflex         to direct LDL Fasting, Albumin Random Urine         Quantitative with Creat Ratio, blood glucose         calibration (NO BRAND SPECIFIED) solution,         blood glucose (NO BRAND SPECIFIED) lancing         device, blood glucose monitoring (NO BRAND         SPECIFIED) meter device kit, blood glucose         monitoring (NO BRAND SPECIFIED) test strip,         blood glucose (NO BRAND SPECIFIED) lancing         device, CANCELED: ALT             (E11.9) Type 2 diabetes mellitus without complication, unspecified long term insulin use status (H)  Comment:    Plan: metFORMIN (GLUCOPHAGE) 500 MG tablet, CBC with         platelets differential, Hemoglobin A1c, Basic         metabolic panel, Lipid panel reflex to direct         LDL Fasting, Albumin Random Urine Quantitative         with Creat Ratio, blood glucose monitoring (NO         BRAND SPECIFIED) test strip, CANCELED: ALT             (R10.12) Left upper quadrant pain  Comment:    Plan: alum & mag hydroxide-simethicone (MYLANTA         ES/MAALOX  ES) 400-400-40 MG/5ML SUSP         suspension, omeprazole 20 MG tablet             (R10.13) Abdominal pain, epigastric  Comment:    Plan: alum & mag hydroxide-simethicone (MYLANTA         ES/MAALOX  ES) 400-400-40 MG/5ML SUSP         suspension, omeprazole 20 MG tablet             (M25.561,  M25.562) Arthralgia of both knees  Comment:    Plan: acetaminophen (TYLENOL) 500 MG tablet             (E55.9) Vitamin D deficiency  Comment:    Plan: cholecalciferol (VITAMIN D3) 65742 UNITS         capsule             (M17.10) Osteoarthrosis involving lower leg  Comment:    Plan:      (H93.13) Tinnitus, bilateral  Comment:    Plan: OTOLARYNGOLOGY REFERRAL              (H90.3) Bilateral sensorineural hearing loss  Comment:    Plan: OTOLARYNGOLOGY REFERRAL             (G44.229) Chronic tension-type headache, not intractable  Comment: 2 months   Plan:      (K04.7) Dental infection  Comment:     Plan:         (F51.01) Primary insomnia  Comment:    Plan:      (M17.0) Primary osteoarthritis of both knees  Comment:    Plan:      (M13.0) Polyarthropathy or polyarthritis, hand  Comment:    Plan:      (G43.019) Intractable migraine without aura and without status migrainosus  Comment:    Plan:      (M54.2) Cervicalgia  Comment:    Plan:      (K75.81) KOHLER (nonalcoholic steatohepatitis)/US of 6-12  Comment:    Plan: Hepatic panel (Albumin, ALT, AST, Bili, Alk         Phos, TP)             (I10) Hypertension goal BP (blood pressure) < 130/80  Comment:    Plan:      (F32.5) Major depression in complete remission (H)  Comment:    Plan:      (K21.9) Gastroesophageal reflux disease without esophagitis  Comment:    Plan:      (F41.1) Generalized anxiety disorder  Comment:    Plan:      (R00.2) Palpitations  Comment:    Plan:

## 2018-02-05 NOTE — LETTER
"February 8, 2018      Clemente Mancilla  [id number]  [facility] [address]        Dear ,    We are writing to inform you of your test results.    Your test results fall within the expected range(s) or remain unchanged from previous results.  Please continue with current treatment plan.  THREE MONTH GLUCOSE AVERAGE IN PRE DIABETES LEVEL  NORMAL COMPLETE BLOOD PANEL WBCS RBCS AND PLATELETS   GLUCOSE, RENAL AND BLOOD SALTS  WITHIN NORMAL LIMITS EXCEPT BORDERLINE  GLUCOSE   BORDERLINE  LDL OR \"BAD\" CHOLESTEROL   The 10-year ASCVD risk score (Sonalthomas GHOSH Jr, et al., 2013) is: 14.1%    Values used to calculate the score:      Age: 63 years      Sex: Female      Is Non- : Yes      Diabetic: Yes      Tobacco smoker: No      Systolic Blood Pressure: 132 mmHg      Is BP treated: No      HDL Cholesterol: 45 mg/dL      Total Cholesterol: 179 mg/dL    WOULD SUGGEST YOU START ON ATORVASTATIN 10MG  1/2 TABLET DAILY TO REDUCE RISK OF MYOCARDIAL INFARCTION OR CEREBROVASCULAR ACCIDENT   ASPIRIN 81 MG EC ALSO RECOMMENDED REDUCE YOUR RISK BY 50%  PLEASE COME BACK TO DISCUSS  SENT MEDICATION TO YOUR [facility]   RECHECK 6 WEEKS     Resulted Orders   CBC with platelets differential   Result Value Ref Range    WBC 4.7 4.0 - 11.0 10e9/L    RBC Count 4.47 3.8 - 5.2 10e12/L    Hemoglobin 13.7 11.7 - 15.7 g/dL    Hematocrit 40.6 35.0 - 47.0 %    MCV 91 78 - 100 fl    MCH 30.6 26.5 - 33.0 pg    MCHC 33.7 31.5 - 36.5 g/dL    RDW 13.3 10.0 - 15.0 %    Platelet Count 195 150 - 450 10e9/L    Diff Method Automated Method     % Neutrophils 50.5 %    % Lymphocytes 38.3 %    % Monocytes 6.6 %    % Eosinophils 4.2 %    % Basophils 0.4 %    Absolute Neutrophil 2.4 1.6 - 8.3 10e9/L    Absolute Lymphocytes 1.8 0.8 - 5.3 10e9/L    Absolute Monocytes 0.3 0.0 - 1.3 10e9/L    Absolute Eosinophils 0.2 0.0 - 0.7 10e9/L    Absolute Basophils 0.0 0.0 - 0.2 10e9/L   Hemoglobin A1c   Result Value Ref Range    Hemoglobin A1C 5.8 4.3 - " 6.0 %   Basic metabolic panel   Result Value Ref Range    Sodium 141 133 - 144 mmol/L    Potassium 4.2 3.4 - 5.3 mmol/L    Chloride 109 94 - 109 mmol/L    Carbon Dioxide 26 20 - 32 mmol/L    Anion Gap 6 3 - 14 mmol/L    Glucose 105 (H) 70 - 99 mg/dL      Comment:      Fasting specimen    Urea Nitrogen 12 7 - 30 mg/dL    Creatinine 0.59 0.52 - 1.04 mg/dL    GFR Estimate >90 >60 mL/min/1.7m2      Comment:      Non  GFR Calc    GFR Estimate If Black >90 >60 mL/min/1.7m2      Comment:       GFR Calc    Calcium 8.6 8.5 - 10.1 mg/dL   Lipid panel reflex to direct LDL Fasting   Result Value Ref Range    Cholesterol 179 <200 mg/dL    Triglycerides 86 <150 mg/dL      Comment:      Fasting specimen    HDL Cholesterol 45 (L) >49 mg/dL    LDL Cholesterol Calculated 117 (H) <100 mg/dL      Comment:      Above desirable:  100-129 mg/dl  Borderline High:  130-159 mg/dL  High:             160-189 mg/dL  Very high:       >189 mg/dl      Non HDL Cholesterol 134 (H) <130 mg/dL      Comment:      Above Desirable:  130-159 mg/dl  Borderline high:  160-189 mg/dl  High:             190-219 mg/dl  Very high:       >219 mg/dl     Albumin Random Urine Quantitative with Creat Ratio   Result Value Ref Range    Creatinine Urine 143 mg/dL    Albumin Urine mg/L 24 mg/L    Albumin Urine mg/g Cr 16.92 0 - 25 mg/g Cr   Hepatic panel (Albumin, ALT, AST, Bili, Alk Phos, TP)   Result Value Ref Range    Bilirubin Direct <0.1 0.0 - 0.2 mg/dL    Bilirubin Total 0.2 0.2 - 1.3 mg/dL    Albumin 3.4 3.4 - 5.0 g/dL    Protein Total 6.9 6.8 - 8.8 g/dL    Alkaline Phosphatase 60 40 - 150 U/L    ALT 26 0 - 50 U/L    AST 18 0 - 45 U/L       If you have any questions or concerns, please call the clinic at the number listed above.       Sincerely,        ROCKY RUANO MD

## 2018-02-05 NOTE — PROGRESS NOTES
SUBJECTIVE:   Clemente Mancilla is a 63 year old female who presents to clinic today for the following health issues:      Diabetes Follow-up      Patient is checking blood sugars: not at all, lost equipment within the last week    Diabetic concerns: None     Symptoms of hypoglycemia (low blood sugar): dizzy     Paresthesias (numbness or burning in feet) or sores: No     Date of last diabetic eye exam: unknown    BP Readings from Last 2 Encounters:   07/21/17 112/62   11/06/15 112/68     Hemoglobin A1C (%)   Date Value   07/21/2017 5.8   09/18/2015 5.6     LDL Cholesterol Calculated (mg/dL)   Date Value   07/21/2017 125 (H)   09/18/2015 109       Amount of exercise or physical activity: None    Problems taking medications regularly: No    Medication side effects: none    Diet: regular (no restrictions)        .ROCKY RUANO MD .2/5/2018 12:51 PM .February 5, 2018        Clemente Mancilla is a 63 year old female who is who presents with  HEADACHES  with HISTORY OF MIGRAINE HEADACHES    Onset :  INTERMITTENT X 2 MONTHS      Severity: MODERATE       Home treatments  IBUPROFEN     Additional Symptoms: PAIN NECK AND BOTH SIDES OF HEADS      Course  ONGOING SYMPTOMS   DENTAL PAIN SUBJECTIVE: MOLAR EXTRACTION   ON AMOXIL WANTS SOMETHING ELSE  TAKING IBUPROFEN FOR THIS AS NEEDED FOR PAIN    OBJECTIVE:  EXTRACTION SITE WITHIN NORMAL LIMITS     ASSESSMENT:  RECENT DENTAL EXTRACTION     PLAN:  SAME TREATMENT                           Topic Date Due     PNEUMOVAX 1X HI RISK PATIENT < 65 (NO IB MSG)  01/01/1959     TETANUS IMMUNIZATION (SYSTEM ASSIGNED)  01/01/1975     PAP SCREENING Q3 YR (SYSTEM ASSIGNED)  01/01/1978     COLON CANCER SCREEN (SYSTEM ASSIGNED)  01/01/2007     MAMMO SCREEN Q2 YR (SYSTEM ASSIGNED)  12/01/2010     MICROALBUMIN Q1 YEAR  06/06/2013     FOOT EXAM Q1 YEAR  04/18/2015     EYE EXAM Q1 YEAR  04/18/2015     INFLUENZA VACCINE (SYSTEM ASSIGNED)  09/01/2017     A1C Q6 MO  01/21/2018     PHQ-9 Q6 MONTHS   01/21/2018               .  Current Outpatient Prescriptions   Medication Sig Dispense Refill     metFORMIN (GLUCOPHAGE) 500 MG tablet TAKE 1 TABLET (500 MG) BY MOUTH 2 TIMES DAILY (WITH MEALS) 60 tablet 11     alum & mag hydroxide-simethicone (MYLANTA ES/MAALOX  ES) 400-400-40 MG/5ML SUSP suspension Take 30 mLs by mouth 4 times daily as needed for indigestion 1 Bottle 11     acetaminophen (TYLENOL) 500 MG tablet Take 1-2 tablets (500-1,000 mg) by mouth every 6 hours as needed for mild pain 120 tablet 11     cholecalciferol (VITAMIN D3) 24738 UNITS capsule Take 1 capsule (50,000 Units) by mouth once a week 4 capsule 11     omeprazole 20 MG tablet Take 2 tablets (40 mg) by mouth daily Take 30-60 minutes before a meal. 60 tablet 11     blood glucose calibration (NO BRAND SPECIFIED) solution Use to calibrate blood glucose monitor as directed. 1 each 11     blood glucose (NO BRAND SPECIFIED) lancing device Device to be used with lancets. 1 each 1     blood glucose monitoring (NO BRAND SPECIFIED) meter device kit Use to test blood sugar  1  times daily or as directed. 1 kit 0     blood glucose monitoring (NO BRAND SPECIFIED) test strip Use to test blood sugars TWICE DAILY PRN  times daily or as directed 100 strip 11     blood glucose (NO BRAND SPECIFIED) lancing device Device to be used with lancets. 1 each 0     meloxicam (MOBIC) 15 MG tablet Take 1 tablet (15 mg) by mouth daily 30 tablet 5     Glucosamine Sulfate 750 MG TABS Take 2 tablets by mouth daily (with breakfast) (Patient not taking: Reported on 7/21/2017) 60 tablet 11     order for DME Equipment being ordered:  ONE PAIR HEEL CUPS*  ONE PAIR*  APPLY DAILY TO FOOT WARE*  TO PROTECT ACHILLES TENDONS*    FLESH COLORED BELOW THE KNEE STOCKINGS*  15-20 milimeters mercury   3 PAIRS*  USE DAILY   WASH DAILY (Patient not taking: Reported on 7/21/2017) 4 Device 2     [DISCONTINUED] metFORMIN (GLUCOPHAGE) 500 MG tablet TAKE 1 TABLET (500 MG) BY MOUTH 2 TIMES DAILY (WITH  MEALS) 60 tablet 0     traMADol-acetaminophen (ULTRACET) 37.5-325 MG per tablet Take 1 tablet by mouth every 6 hours as needed for moderate pain (Patient not taking: Reported on 2017) 120 tablet 1     lidocaine (XYLOCAINE) 5 % ointment One application 4 x daily to affected areas lower back and knees if necessary (Patient not taking: Reported on 2017) 142 g 11              No Known Allergies            There is no immunization history on file for this patient.          reports that she does not drink alcohol.          reports that she does not use illicit drugs.        family history includes Family History Negative in her father, mother, and another family member.        indicated that her mother is . She indicated that her father is . She indicated that both of her daughters are alive. She indicated that all of her three sons are alive. She indicated that the status of her other is unknown.          has a past surgical history that includes Cholecystectomy (2000) and GYN surgery ().         reports that she does not engage in sexual activity.    .  Pediatric History   Patient Guardian Status     Not on file.     Other Topics Concern     Parent/Sibling W/ Cabg, Mi Or Angioplasty Before 65f 55m? No     Social History Narrative    Surgical History  Return To Top     Status Surgery Time Frame Comment Record Date     Inactive  gyn surgery  5 years ago    2008      Inactive  cholecsystectomy  2000          --------------------------------------------------------------------------------    Food Allergy  Return To Top     Allergen Reaction Comment Record Date     * No known food allergies      2008          --------------------------------------------------------------------------------    Drug Allergy  Return To Top     Allergen Reaction Comment Record Date     * No known drug allergies      2008           --------------------------------------------------------------------------------    Environment Allergy  Return To Top     Allergen Reaction Comment Record Date     * No known environmental allergies      3/9/2010          --------------------------------------------------------------------------------    Social History  Return To Top     Question Answer Comment Record Date     Marital status      11/26/2008      Advance Directive or Living Will  No    4/15/2010      Emotional Abuse  No    4/15/2010      Exercise  No    4/15/2010      Caffeine  Yes    5/20/2010      Physical Abuse  No    4/15/2010      Sealtbelts  Yes    4/15/2010      Sexual Abuse  No    4/15/2010      Breast/Testicle Self Check  Yes    5/20/2010      Number of children  5    11/26/2008      Living arrangements  House    11/26/2008      Number of children in household  0    4/15/2010      Number of adults in household  1    4/15/2010      Education level  none    4/15/2010      Employment  Currently unemployed    4/15/2010      Tobacco history  Has never smoked or chewed tobacco    11/26/2008      Alcohol history  Never drinks alcohol    4/15/2010      Has the patient ever used illegal drugs?  Has never used illegal drugs    4/15/2010      Has the patient used marijuana?  No    4/15/2010      Has the patient used cocaine?  No    4/15/2010          --------------------------------------------------------------------------------    Medical History Return To Top     Status Diagnosis Time Frame Comment Record Date     Active (924.11) - C - Knee contusion    RIGHT KNEE  2/21/2012      Active (780.52) - C - Insomnia      2/2/2012      Active (268.9) - C - VITAMIN D DEFICIENCY      2/2/2012      Active (250.02) - C - Diabetes mellitus, type II uncontrolled    NEWLY DISCOVERED AT GYNECOLOGIST  9/13/2010      Active (715.96) - C - Osteoarthritis of knee    right knee  9/13/2010      Active (789.02) - C - Left upper quadrant abdominal pain       7/1/2010      Active (719.41) - C - Shoulder pain      5/20/2010      Active (300.02) - C - Anxiety disorder, generalized      4/7/2010      Active (785.1) - C - Palpitations      3/9/2010      Active (716.50) - C - UNSPEC POLYARTHRITIS UNSP      1/12/2010      Active 719.46 Knee pain      1/12/2010      Active (250.00) - C - Diabetes mellitus, type II controlled      1/12/2010      Active (272.4) - C - Hyperlipidemia      12/2/2009      Active (716.54) - C - POLYARTHRITIS UNSP HAND      8/20/2009      Active 372.14 Allergic conjunctivitis      8/20/2009      Active (401.1) - C - Hypertension, benign      1/27/2009      Active 346.10 Migraine, common, not intractable    INTERMITTENT SICK HEADACHES FOR MANY YEARS  1/27/2009      Active V76.12 Screening, mammogram      11/12/2008      Active (311) - C - Depression Disorder, not elsewhere specified      11/6/2008      Active (723.1) - C - Neck pain      11/6/2008      Active (780.79) - C - Fatigue      11/6/2008      Active (530.81) - C - GERD      11/6/2008      Active (784.0) - C - Headache, unspec.      11/6/2008      Active (724.2) - C - Low back pain      11/6/2008      Active (278.00) - C - Obesity      11/6/2008      Inactive  V77.91 Screening, lipids      11/6/2008      Inactive  V82.81 Screening, osteoporosis      11/6/2008      Active Abruptio placentae      2/6/2012          --------------------------------------------------------------------------------        --------------------------------------------------------------------------------    Infection History Return To Top     Question Answer Comment Record Date     Other  HIV    11/6/2008      History of HPV  No    11/6/2008      History of gonorrhea  No    11/6/2008      History of syphilis  No    11/6/2008      Live with someone with TB or exposed to TB  No    11/6/2008      History of Hepatitis B  No    11/6/2008      History of chlamydia  No    11/6/2008           --------------------------------------------------------------------------------                       reports that she has never smoked. She has never used smokeless tobacco.        Medical, social, surgical, and family histories reviewed.        Labs reviewed in EPIC  Patient Active Problem List   Diagnosis     Vitamin D deficiency     Insomnia     Osteoarthrosis involving lower leg     Abdominal pain, left upper quadrant     Pain in joint, shoulder region     Generalized anxiety disorder     Palpitations     Diabetes mellitus type 2, controlled, without complications (H)     Unspecified polyarthropathy or polyarthritis, site unspecified     Pain in joint, lower leg     Hyperlipidemia     Polyarthropathy or polyarthritis, hand     Other chronic allergic conjunctivitis     Migraine without aura     Morbid obesity due to excess calories (H)     Major depression in complete remission (H)     Gastroesophageal reflux disease without esophagitis     Cervicalgia     Malaise and fatigue     Headache     Abruptio placenta     Hypertension goal BP (blood pressure) < 130/80     Latent tuberculosis by blood test: 6-12 see letter from [facility] refusing Rx      Obesity     KOHLER (nonalcoholic steatohepatitis)/US of 6-12     Weight loss from 213# in 2007 to 198# now      Hyperlipidemia with target LDL less than 100     Abdominal pain, epigastric       Past Surgical History:   Procedure Laterality Date     CHOLECYSTECTOMY  11/06/2000     GYN SURGERY  2003         Social History   Substance Use Topics     Smoking status: Never Smoker     Smokeless tobacco: Never Used     Alcohol use No       Family History   Problem Relation Age of Onset     Family History Negative Mother      Family History Negative Father      Family History Negative Other      neg for arthritis             Current Outpatient Prescriptions   Medication Sig Dispense Refill     metFORMIN (GLUCOPHAGE) 500 MG tablet TAKE 1 TABLET (500 MG) BY  MOUTH 2 TIMES DAILY (WITH MEALS) 60 tablet 11     alum & mag hydroxide-simethicone (MYLANTA ES/MAALOX  ES) 400-400-40 MG/5ML SUSP suspension Take 30 mLs by mouth 4 times daily as needed for indigestion 1 Bottle 11     acetaminophen (TYLENOL) 500 MG tablet Take 1-2 tablets (500-1,000 mg) by mouth every 6 hours as needed for mild pain 120 tablet 11     cholecalciferol (VITAMIN D3) 78456 UNITS capsule Take 1 capsule (50,000 Units) by mouth once a week 4 capsule 11     omeprazole 20 MG tablet Take 2 tablets (40 mg) by mouth daily Take 30-60 minutes before a meal. 60 tablet 11     blood glucose calibration (NO BRAND SPECIFIED) solution Use to calibrate blood glucose monitor as directed. 1 each 11     blood glucose (NO BRAND SPECIFIED) lancing device Device to be used with lancets. 1 each 1     blood glucose monitoring (NO BRAND SPECIFIED) meter device kit Use to test blood sugar  1  times daily or as directed. 1 kit 0     blood glucose monitoring (NO BRAND SPECIFIED) test strip Use to test blood sugars TWICE DAILY PRN  times daily or as directed 100 strip 11     blood glucose (NO BRAND SPECIFIED) lancing device Device to be used with lancets. 1 each 0     meloxicam (MOBIC) 15 MG tablet Take 1 tablet (15 mg) by mouth daily 30 tablet 5     Glucosamine Sulfate 750 MG TABS Take 2 tablets by mouth daily (with breakfast) (Patient not taking: Reported on 7/21/2017) 60 tablet 11     order for DME Equipment being ordered:  ONE PAIR HEEL CUPS*  ONE PAIR*  APPLY DAILY TO FOOT WARE*  TO PROTECT ACHILLES TENDONS*    FLESH COLORED BELOW THE KNEE STOCKINGS*  15-20 milimeters mercury   3 PAIRS*  USE DAILY   WASH DAILY (Patient not taking: Reported on 7/21/2017) 4 Device 2     [DISCONTINUED] metFORMIN (GLUCOPHAGE) 500 MG tablet TAKE 1 TABLET (500 MG) BY MOUTH 2 TIMES DAILY (WITH MEALS) 60 tablet 0     traMADol-acetaminophen (ULTRACET) 37.5-325 MG per tablet Take 1 tablet by mouth every 6 hours as needed for moderate pain (Patient not  taking: Reported on 7/21/2017) 120 tablet 1     lidocaine (XYLOCAINE) 5 % ointment One application 4 x daily to affected areas lower back and knees if necessary (Patient not taking: Reported on 7/21/2017) 142 g 11           Recent Labs   Lab Test  07/21/17   1356  09/18/15   1436  06/06/12   1939  02/02/12   1933   A1C  5.8  5.6  6.2*  6.0   LDL  125*  109  115.4*  111.0*   HDL  46*  50*  38  45   TRIG  86  68  123  95   ALT  23  31   --   42.0   CR  0.65   --   0.6  0.5*   GFRESTIMATED  >90  Non  GFR Calc     --    --    --    GFRESTBLACK  >90   GFR Calc     --    --    --    POTASSIUM  3.9   --   4.3  4.2   TSH  2.18   --    --    --             BP Readings from Last 6 Encounters:   02/05/18 132/76   07/21/17 112/62   11/06/15 112/68   10/02/15 110/62   09/18/15 114/60   08/06/14 110/70           Wt Readings from Last 3 Encounters:   02/05/18 190 lb 8 oz (86.4 kg)   07/21/17 182 lb (82.6 kg)   11/06/15 186 lb 3.2 oz (84.5 kg)                 Positive symptoms or findings indicated by bold designation:         ROS: 10 point ROS neg other than the symptoms noted above in the HPI.except  has Vitamin D deficiency; Insomnia; Osteoarthrosis involving lower leg; Abdominal pain, left upper quadrant; Pain in joint, shoulder region; Generalized anxiety disorder; Palpitations; Diabetes mellitus type 2, controlled, without complications (H); Unspecified polyarthropathy or polyarthritis, site unspecified; Pain in joint, lower leg; Hyperlipidemia; Polyarthropathy or polyarthritis, hand; Other chronic allergic conjunctivitis; Migraine without aura; Morbid obesity due to excess calories (H); Major depression in complete remission (H); Gastroesophageal reflux disease without esophagitis; Cervicalgia; Malaise and fatigue; Headache; Abruptio placenta; Hypertension goal BP (blood pressure) < 130/80; Latent tuberculosis by blood test: 6-12 see letter from [facility] refusing Rx ;  "Obesity; KOHLER (nonalcoholic steatohepatitis)/US of 6-12; Weight loss from 213# in 2007 to 198# now ; Hyperlipidemia with target LDL less than 100; and Abdominal pain, epigastric on her problem list.  Review Of Systems    Skin: negative    Eyes: negative    Ears/Nose/Throat:  DENTAL PAIN    Respiratory: No shortness of breath, dyspnea on exertion, cough, or hemoptysis    Cardiovascular: negative    Gastrointestinal: heartburn    Genitourinary: negative    Musculoskeletal: back pain and neck pain    Neurologic: negative    Psychiatric: ANXIETY     Hematologic/Lymphatic/Immunologic: negative    Endocrine: negative and diabetes                PE:  /76  Pulse 74  Temp 98.2  F (36.8  C) (Tympanic)  Resp 16  Ht 5' 2\" (1.575 m)  Wt 190 lb 8 oz (86.4 kg)  SpO2 99%  BMI 34.84 kg/m2 Body mass index is 34.84 kg/(m^2).        Constitutional: general appearance, well nourished, well developed, in no acute distress, well developed, appears stated age, normal body habitus,          Eyes:; The patient has normal eyelids sclerae and conjunctivae :          Ears/Nose/Throat: external ear, overall: normal appearance; external nose, overall: benign appearance, normal moujth gums and lips           Neck: thyroid, overall: normal size, normal consistency, nontender,          Respiratory:  palpation of chest, overall: normal excursion,    Clear to percussion and auscultation     NO Tachypnea    NORMAL  Color          Cardiovascular:  Good color with no peripheral edema  NORMAL   Regular sinus rhythm without murmur.  Physiologic heart sounds  NORMAL  Heart is unelarged    .     Chest/Breast: normal shape  NORMAL          Abdominal exam,  Liver and spleen are  unenlarged NORMAL       Tenderness  NORMAL   Scars NORMAL             Urogenital; no renal, flank or bladder  tenderness;  NORMAL         Lymphatic: neck nodes,  NORMAL    Other nodes  WITHIN NORMAL LIMITS         Musculoskeletal:  Brief ortho exam normal except:  DECREASE " RANGE OF MOTION OF BACK AND NECK         Integument: inspection of skin, no rash, lesions; and, palpation, no induration, no tenderness.          Neurologic mental status, overall: alert and oriented; gait, no ataxia, no unsteadiness; coordination, no tremors; cranial nerves, overall: normal motor, overall: normal bulk, tone.          Psychiatric: orientation/consciousness, overall: oriented to person, place and time; behavior/psychomotor activity, no tics, normal psychomotor activity; mood and affect, overall: normal mood and affect; appearance, overall: well-groomed, good eye contact; speech, overall: normal quality, no aphasia and normal quality, quantity, intact.        Diagnostic Test Results:  Results for orders placed or performed in visit on 07/21/17   Basic metabolic panel   Result Value Ref Range    Sodium 142 133 - 144 mmol/L    Potassium 3.9 3.4 - 5.3 mmol/L    Chloride 111 (H) 94 - 109 mmol/L    Carbon Dioxide 25 20 - 32 mmol/L    Anion Gap 6 3 - 14 mmol/L    Glucose 120 (H) 70 - 99 mg/dL    Urea Nitrogen 11 7 - 30 mg/dL    Creatinine 0.65 0.52 - 1.04 mg/dL    GFR Estimate >90  Non  GFR Calc   >60 mL/min/1.7m2    GFR Estimate If Black >90   GFR Calc   >60 mL/min/1.7m2    Calcium 9.0 8.5 - 10.1 mg/dL   Lipid panel reflex to direct LDL   Result Value Ref Range    Cholesterol 188 <200 mg/dL    Triglycerides 86 <150 mg/dL    HDL Cholesterol 46 (L) >49 mg/dL    LDL Cholesterol Calculated 125 (H) <100 mg/dL    Non HDL Cholesterol 142 (H) <130 mg/dL   Hemoglobin A1c   Result Value Ref Range    Hemoglobin A1C 5.8 4.3 - 6.0 %   ALT   Result Value Ref Range    ALT 23 0 - 50 U/L   CBC with platelets differential   Result Value Ref Range    WBC 4.7 4.0 - 11.0 10e9/L    RBC Count 4.46 3.8 - 5.2 10e12/L    Hemoglobin 13.6 11.7 - 15.7 g/dL    Hematocrit 40.6 35.0 - 47.0 %    MCV 91 78 - 100 fl    MCH 30.5 26.5 - 33.0 pg    MCHC 33.5 31.5 - 36.5 g/dL    RDW 13.5 10.0 - 15.0 %    Platelet  Count 208 150 - 450 10e9/L    Diff Method Automated Method     % Neutrophils 50.9 %    % Lymphocytes 38.7 %    % Monocytes 5.3 %    % Eosinophils 4.7 %    % Basophils 0.4 %    Absolute Neutrophil 2.4 1.6 - 8.3 10e9/L    Absolute Lymphocytes 1.8 0.8 - 5.3 10e9/L    Absolute Monocytes 0.3 0.0 - 1.3 10e9/L    Absolute Eosinophils 0.2 0.0 - 0.7 10e9/L    Absolute Basophils 0.0 0.0 - 0.2 10e9/L   Vitamin D Deficiency   Result Value Ref Range    Vitamin D Deficiency screening 18 (L) 20 - 75 ug/L   TSH   Result Value Ref Range    TSH 2.18 0.40 - 4.00 mU/L           ICD-10-CM    1. Controlled type 2 diabetes mellitus without complication, with long-term current use of insulin (H) E11.9 CBC with platelets differential    Z79.4 Hemoglobin A1c     Basic metabolic panel     Lipid panel reflex to direct LDL Fasting     Albumin Random Urine Quantitative with Creat Ratio     blood glucose calibration (NO BRAND SPECIFIED) solution     blood glucose (NO BRAND SPECIFIED) lancing device     blood glucose monitoring (NO BRAND SPECIFIED) meter device kit     blood glucose monitoring (NO BRAND SPECIFIED) test strip     blood glucose (NO BRAND SPECIFIED) lancing device     CANCELED: ALT   2. Type 2 diabetes mellitus without complication, unspecified long term insulin use status (H) E11.9 metFORMIN (GLUCOPHAGE) 500 MG tablet     CBC with platelets differential     Hemoglobin A1c     Basic metabolic panel     Lipid panel reflex to direct LDL Fasting     Albumin Random Urine Quantitative with Creat Ratio     blood glucose monitoring (NO BRAND SPECIFIED) test strip     CANCELED: ALT   3. Left upper quadrant pain R10.12 alum & mag hydroxide-simethicone (MYLANTA ES/MAALOX  ES) 400-400-40 MG/5ML SUSP suspension     omeprazole 20 MG tablet   4. Abdominal pain, epigastric R10.13 alum & mag hydroxide-simethicone (MYLANTA ES/MAALOX  ES) 400-400-40 MG/5ML SUSP suspension     omeprazole 20 MG tablet   5. Arthralgia of both knees M25.561 acetaminophen  (TYLENOL) 500 MG tablet    M25.562    6. Vitamin D deficiency E55.9 cholecalciferol (VITAMIN D3) 65875 UNITS capsule   7. Osteoarthrosis involving lower leg M17.10    8. Tinnitus, bilateral H93.13 OTOLARYNGOLOGY REFERRAL   9. Bilateral sensorineural hearing loss H90.3 OTOLARYNGOLOGY REFERRAL   10. Chronic tension-type headache, not intractable G44.229     2 months    11. Dental infection K04.7    12. Primary insomnia F51.01    13. Primary osteoarthritis of both knees M17.0    14. Polyarthropathy or polyarthritis, hand M13.0    15. Intractable migraine without aura and without status migrainosus G43.019    16. Cervicalgia M54.2    17. KOHLER (nonalcoholic steatohepatitis)/US of 6-12 K75.81 Hepatic panel (Albumin, ALT, AST, Bili, Alk Phos, TP)   18. Hypertension goal BP (blood pressure) < 130/80 I10    19. Major depression in complete remission (H) F32.5    20. Gastroesophageal reflux disease without esophagitis K21.9    21. Generalized anxiety disorder F41.1    22. Palpitations R00.2               .    Side effects benefits and risks thoroughly discussed. .she may come in early if unimproved or getting worse          Please drink 2 glasses of water prior to meals and walk 15-30 minutes after meals        I spent 25 MINUTES SPENT  with patient discussing the following issues   The primary encounter diagnosis was Controlled type 2 diabetes mellitus without complication, with long-term current use of insulin (H). Diagnoses of Type 2 diabetes mellitus without complication, unspecified long term insulin use status (H), Left upper quadrant pain, Abdominal pain, epigastric, Arthralgia of both knees, Vitamin D deficiency, Osteoarthrosis involving lower leg, Tinnitus, bilateral, Bilateral sensorineural hearing loss, Chronic tension-type headache, not intractable, Dental infection, Primary insomnia, Primary osteoarthritis of both knees, Polyarthropathy or polyarthritis, hand, Intractable migraine without aura and without status  migrainosus, Cervicalgia, KOHLER (nonalcoholic steatohepatitis)/US of 6-12, Hypertension goal BP (blood pressure) < 130/80, Major depression in complete remission (H), Gastroesophageal reflux disease without esophagitis, Generalized anxiety disorder, and Palpitations were also pertinent to this visit. over half of which involved counseling and coordination of care.      Patient Instructions   (E11.9,  Z79.4) Controlled type 2 diabetes mellitus without complication, with long-term current use of insulin (H)  (primary encounter diagnosis)  Comment:    Plan: CBC with platelets differential, Hemoglobin         A1c, Basic metabolic panel, Lipid panel reflex         to direct LDL Fasting, Albumin Random Urine         Quantitative with Creat Ratio, blood glucose         calibration (NO BRAND SPECIFIED) solution,         blood glucose (NO BRAND SPECIFIED) lancing         device, blood glucose monitoring (NO BRAND         SPECIFIED) meter device kit, blood glucose         monitoring (NO BRAND SPECIFIED) test strip,         blood glucose (NO BRAND SPECIFIED) lancing         device, CANCELED: ALT             (E11.9) Type 2 diabetes mellitus without complication, unspecified long term insulin use status (H)  Comment:    Plan: metFORMIN (GLUCOPHAGE) 500 MG tablet, CBC with         platelets differential, Hemoglobin A1c, Basic         metabolic panel, Lipid panel reflex to direct         LDL Fasting, Albumin Random Urine Quantitative         with Creat Ratio, blood glucose monitoring (NO         BRAND SPECIFIED) test strip, CANCELED: ALT             (R10.12) Left upper quadrant pain  Comment:    Plan: alum & mag hydroxide-simethicone (MYLANTA         ES/MAALOX  ES) 400-400-40 MG/5ML SUSP         suspension, omeprazole 20 MG tablet             (R10.13) Abdominal pain, epigastric  Comment:    Plan: alum & mag hydroxide-simethicone (MYLANTA         ES/MAALOX  ES) 400-400-40 MG/5ML SUSP         suspension, omeprazole 20 MG tablet              (M25.561,  M25.562) Arthralgia of both knees  Comment:    Plan: acetaminophen (TYLENOL) 500 MG tablet             (E55.9) Vitamin D deficiency  Comment:    Plan: cholecalciferol (VITAMIN D3) 55409 UNITS         capsule             (M17.10) Osteoarthrosis involving lower leg  Comment:    Plan:      (H93.13) Tinnitus, bilateral  Comment:    Plan: OTOLARYNGOLOGY REFERRAL             (H90.3) Bilateral sensorineural hearing loss  Comment:    Plan: OTOLARYNGOLOGY REFERRAL             (G44.229) Chronic tension-type headache, not intractable  Comment: 2 months   Plan:      (K04.7) Dental infection  Comment:     Plan:         (F51.01) Primary insomnia  Comment:    Plan:      (M17.0) Primary osteoarthritis of both knees  Comment:    Plan:      (M13.0) Polyarthropathy or polyarthritis, hand  Comment:    Plan:      (G43.019) Intractable migraine without aura and without status migrainosus  Comment:    Plan:      (M54.2) Cervicalgia  Comment:    Plan:      (K75.81) KOHLER (nonalcoholic steatohepatitis)/US of 6-12  Comment:    Plan: Hepatic panel (Albumin, ALT, AST, Bili, Alk         Phos, TP)             (I10) Hypertension goal BP (blood pressure) < 130/80  Comment:    Plan:      (F32.5) Major 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

## 2018-02-05 NOTE — LETTER
"February 8, 2018      Clemente Mancilla  [id number]  [facility] [address]        Dear ,    We are writing to inform you of your test results.    THREE MONTH GLUCOSE AVERAGE IN PRE DIABETES LEVEL   NORMAL COMPLETE BLOOD PANEL WBCS RBCS AND PLATELETS   GLUCOSE, RENAL AND BLOOD SALTS  WITHIN NORMAL LIMITS EXCEPT BORDERLINE GLUCOSE   BORDERLINE  LDL OR \"BAD\" CHOLESTEROL   WOULD SUGGEST YOU START ON ATORVASTATIN 10MG  1/2 TABLET DAILY TO REDUCE RISK OF MYOCARDIAL INFARCTION OR CEREBROVASCULAR ACCIDENT   ASPIRIN 81 MG EC ALSO RECOMMENDED REDUCE YOUR RISK BY 50%   PLEASE COME BACK TO DISCUSS   SENT MEDICATION TO YOUR [facility]   RECHECK 6 WEEKS     Resulted Orders   CBC with platelets differential   Result Value Ref Range    WBC 4.7 4.0 - 11.0 10e9/L    RBC Count 4.47 3.8 - 5.2 10e12/L    Hemoglobin 13.7 11.7 - 15.7 g/dL    Hematocrit 40.6 35.0 - 47.0 %    MCV 91 78 - 100 fl    MCH 30.6 26.5 - 33.0 pg    MCHC 33.7 31.5 - 36.5 g/dL    RDW 13.3 10.0 - 15.0 %    Platelet Count 195 150 - 450 10e9/L    Diff Method Automated Method     % Neutrophils 50.5 %    % Lymphocytes 38.3 %    % Monocytes 6.6 %    % Eosinophils 4.2 %    % Basophils 0.4 %    Absolute Neutrophil 2.4 1.6 - 8.3 10e9/L    Absolute Lymphocytes 1.8 0.8 - 5.3 10e9/L    Absolute Monocytes 0.3 0.0 - 1.3 10e9/L    Absolute Eosinophils 0.2 0.0 - 0.7 10e9/L    Absolute Basophils 0.0 0.0 - 0.2 10e9/L   Hemoglobin A1c   Result Value Ref Range    Hemoglobin A1C 5.8 4.3 - 6.0 %   Basic metabolic panel   Result Value Ref Range    Sodium 141 133 - 144 mmol/L    Potassium 4.2 3.4 - 5.3 mmol/L    Chloride 109 94 - 109 mmol/L    Carbon Dioxide 26 20 - 32 mmol/L    Anion Gap 6 3 - 14 mmol/L    Glucose 105 (H) 70 - 99 mg/dL      Comment:      Fasting specimen    Urea Nitrogen 12 7 - 30 mg/dL    Creatinine 0.59 0.52 - 1.04 mg/dL    GFR Estimate >90 >60 mL/min/1.7m2      Comment:      Non  GFR Calc    GFR Estimate If Black >90 >60 mL/min/1.7m2      " Comment:       GFR Calc    Calcium 8.6 8.5 - 10.1 mg/dL   Lipid panel reflex to direct LDL Fasting   Result Value Ref Range    Cholesterol 179 <200 mg/dL    Triglycerides 86 <150 mg/dL      Comment:      Fasting specimen    HDL Cholesterol 45 (L) >49 mg/dL    LDL Cholesterol Calculated 117 (H) <100 mg/dL      Comment:      Above desirable:  100-129 mg/dl  Borderline High:  130-159 mg/dL  High:             160-189 mg/dL  Very high:       >189 mg/dl      Non HDL Cholesterol 134 (H) <130 mg/dL      Comment:      Above Desirable:  130-159 mg/dl  Borderline high:  160-189 mg/dl  High:             190-219 mg/dl  Very high:       >219 mg/dl     Albumin Random Urine Quantitative with Creat Ratio   Result Value Ref Range    Creatinine Urine 143 mg/dL    Albumin Urine mg/L 24 mg/L    Albumin Urine mg/g Cr 16.92 0 - 25 mg/g Cr   Hepatic panel (Albumin, ALT, AST, Bili, Alk Phos, TP)   Result Value Ref Range    Bilirubin Direct <0.1 0.0 - 0.2 mg/dL    Bilirubin Total 0.2 0.2 - 1.3 mg/dL    Albumin 3.4 3.4 - 5.0 g/dL    Protein Total 6.9 6.8 - 8.8 g/dL    Alkaline Phosphatase 60 40 - 150 U/L    ALT 26 0 - 50 U/L    AST 18 0 - 45 U/L       If you have any questions or concerns, please call the clinic at the number listed above.       Sincerely,        ROCKY RUANO MD

## 2018-02-06 LAB
ALBUMIN SERPL-MCNC: 3.4 G/DL (ref 3.4–5)
ALP SERPL-CCNC: 60 U/L (ref 40–150)
ALT SERPL W P-5'-P-CCNC: 26 U/L (ref 0–50)
ANION GAP SERPL CALCULATED.3IONS-SCNC: 6 MMOL/L (ref 3–14)
AST SERPL W P-5'-P-CCNC: 18 U/L (ref 0–45)
BILIRUB DIRECT SERPL-MCNC: <0.1 MG/DL (ref 0–0.2)
BILIRUB SERPL-MCNC: 0.2 MG/DL (ref 0.2–1.3)
BUN SERPL-MCNC: 12 MG/DL (ref 7–30)
CALCIUM SERPL-MCNC: 8.6 MG/DL (ref 8.5–10.1)
CHLORIDE SERPL-SCNC: 109 MMOL/L (ref 94–109)
CHOLEST SERPL-MCNC: 179 MG/DL
CO2 SERPL-SCNC: 26 MMOL/L (ref 20–32)
CREAT SERPL-MCNC: 0.59 MG/DL (ref 0.52–1.04)
CREAT UR-MCNC: 143 MG/DL
GFR SERPL CREATININE-BSD FRML MDRD: >90 ML/MIN/1.7M2
GLUCOSE SERPL-MCNC: 105 MG/DL (ref 70–99)
HDLC SERPL-MCNC: 45 MG/DL
LDLC SERPL CALC-MCNC: 117 MG/DL
MICROALBUMIN UR-MCNC: 24 MG/L
MICROALBUMIN/CREAT UR: 16.92 MG/G CR (ref 0–25)
NONHDLC SERPL-MCNC: 134 MG/DL
POTASSIUM SERPL-SCNC: 4.2 MMOL/L (ref 3.4–5.3)
PROT SERPL-MCNC: 6.9 G/DL (ref 6.8–8.8)
SODIUM SERPL-SCNC: 141 MMOL/L (ref 133–144)
TRIGL SERPL-MCNC: 86 MG/DL

## 2018-02-06 ASSESSMENT — PATIENT HEALTH QUESTIONNAIRE - PHQ9: SUM OF ALL RESPONSES TO PHQ QUESTIONS 1-9: 0

## 2018-02-08 RX ORDER — ATORVASTATIN CALCIUM 10 MG/1
10 TABLET, FILM COATED ORAL DAILY
Qty: 90 TABLET | Refills: 11 | Status: SHIPPED | OUTPATIENT
Start: 2018-02-08

## 2019-02-21 ENCOUNTER — TELEPHONE (OUTPATIENT)
Dept: FAMILY MEDICINE | Facility: CLINIC | Age: 64
End: 2019-02-21

## 2019-02-21 NOTE — TELEPHONE ENCOUNTER
Panel Management Review      Patient has the following on her problem list:     Depression / Dysthymia review    Measure:  Needs PHQ-9 score of 4 or less during index window.  Administer PHQ-9 and if score is 5 or more, send encounter to provider for next steps.    5 - 7 month window range:     PHQ-9 SCORE 9/18/2015 7/21/2017 2/5/2018   PHQ-9 Total Score - - -   PHQ-9 Total Score 9 0 0       If PHQ-9 recheck is 5 or more, route to provider for next steps.    Patient is due for:  PHQ9 and DAP    Diabetes    ASA: Passed    Last A1C  Lab Results   Component Value Date    A1C 5.8 02/05/2018    A1C 5.8 07/21/2017    A1C 5.6 09/18/2015    A1C 6.2 06/06/2012    A1C 6.0 02/02/2012     A1C tested: Passed    Last LDL:    Lab Results   Component Value Date    CHOL 179 02/05/2018     Lab Results   Component Value Date    HDL 45 02/05/2018     Lab Results   Component Value Date     02/05/2018     Lab Results   Component Value Date    TRIG 86 02/05/2018     Lab Results   Component Value Date    CHOLHDLRATIO 3.5 09/18/2015     Lab Results   Component Value Date    NHDL 134 02/05/2018       Is the patient on a Statin? YES             Is the patient on Aspirin? YES    Medications     HMG CoA Reductase Inhibitors     atorvastatin (LIPITOR) 10 MG tablet       Salicylates     aspirin 81 MG EC tablet             Last three blood pressure readings:  BP Readings from Last 3 Encounters:   02/05/18 132/76   07/21/17 112/62   11/06/15 112/68       Date of last diabetes office visit: 2-5-2018     Tobacco History:     History   Smoking Status     Never Smoker   Smokeless Tobacco     Never Used         Hypertension   Last three blood pressure readings:  BP Readings from Last 3 Encounters:   02/05/18 132/76   07/21/17 112/62   11/06/15 112/68     Blood pressure: Passed    HTN Guidelines:  Age 18-59 BP range:  Less than 140/90  Age 60-85 with Diabetes:  Less than 140/90  Age 60-85 without Diabetes:  less than 150/90      Composite cancer  screening  Chart review shows that this patient is due/due soon for the following Mammogram and ColonoscopyColonoscopy  Summary:    Patient is due/failing the following:   A1C, COLONOSCOPY, DAP, MAMMOGRAM and PHQ9    Action needed:   Patient needs office visit for physical.    Type of outreach:    Sent letter.    Questions for provider review:    None                                                                                                                                    Breonna Dumas CMA

## 2019-02-21 NOTE — LETTER
February 21, 2019    Clemente Mancilla  [id number]  [facility] [address]    Dear Piotr Cornejo cares about your health and your health plan.  I have reviewed your medical conditions, medication list and lab results, and am making recommendations based on this review to better manage your health.    You are in particular need of attention regarding:  -Depression/Anxiety  -Wellness (Physical) Visit     I am recommending that you:     -schedule a WELLNESS (Physical) APPOINTMENT with me.   I will check fasting labs the same day - nothing to eat except water and meds for 8-10 hours prior.    -schedule a COLONOSCOPY to look for colon cancer (due every 10 years or 5 years in higher risk situations.)        Colon cancer is now the second leading cause of cancer-related deaths in the [place] for both men and women and there are over 130,000 new cases and 50,000 deaths per year from colon cancer.  Colonoscopies can prevent 90-95% of these deaths.  Problem lesions can be removed before they ever become cancer.  This test is not only looking for cancer, but also getting rid of precancerious lesions.    If you are under/uninsured, we recommend you contact the [employer] Well program. [employer] Well is a free colorectal cancer screening program that provides colonoscopies for eligible under/uninsured [place] men and women. If you are interested in receiving a free colonoscopy, please call [employer] at [phone] (mention code [employer]) to see if you re eligible.      If you do not wish to do a colonoscopy or cannot afford to do one, at this time, there is another option. It is called a FIT test or Fecal Immunochemical Occult Blood Test (take home stool sample kit).  It does not replace the colonoscopy for colorectal cancer screening, but it can detect hidden bleeding in the lower colon.  It does need to be repeated every year and if a positive result is obtained, you would be referred for a colonoscopy.           If you have completed either one of these tests at another facility, please call with the details of when and where the tests were done and if they were normal or not. Or have the records sent to our clinic so that we can best coordinate your care.      Please call us at the [place] location:  [phone] or use [employer] to address the above recommendations.     Thank you for trusting [facility].  We appreciate the opportunity to serve you and look forward to supporting your healthcare in the future.    If you have (or plan to have) any of these tests done at a facility other than a [facility] or a [facility], please have the results sent to the [facility] location noted above.      Best Regards,    Payal Cook MD

## 2019-03-18 ENCOUNTER — OFFICE VISIT (OUTPATIENT)
Dept: OBGYN | Facility: CLINIC | Age: 64
End: 2019-03-18
Payer: MEDICAID

## 2019-03-18 VITALS — DIASTOLIC BLOOD PRESSURE: 76 MMHG | SYSTOLIC BLOOD PRESSURE: 110 MMHG | WEIGHT: 180 LBS

## 2019-03-18 DIAGNOSIS — N95.0 PMB (POSTMENOPAUSAL BLEEDING): Primary | ICD-10-CM

## 2019-03-18 DIAGNOSIS — Z12.4 SCREENING FOR CERVICAL CANCER: ICD-10-CM

## 2019-03-18 LAB — HGB BLD-MCNC: 13.2 G/DL (ref 11.7–15.7)

## 2019-03-18 PROCEDURE — G0145 SCR C/V CYTO,THINLAYER,RESCR: HCPCS | Performed by: OBSTETRICS & GYNECOLOGY

## 2019-03-18 PROCEDURE — 87624 HPV HI-RISK TYP POOLED RSLT: CPT | Performed by: OBSTETRICS & GYNECOLOGY

## 2019-03-18 PROCEDURE — 85018 HEMOGLOBIN: CPT | Performed by: OBSTETRICS & GYNECOLOGY

## 2019-03-18 PROCEDURE — T1013 SIGN LANG/ORAL INTERPRETER: HCPCS | Mod: U3 | Performed by: OBSTETRICS & GYNECOLOGY

## 2019-03-18 PROCEDURE — 99203 OFFICE O/P NEW LOW 30 MIN: CPT | Performed by: OBSTETRICS & GYNECOLOGY

## 2019-03-18 PROCEDURE — G0476 HPV COMBO ASSAY CA SCREEN: HCPCS | Performed by: OBSTETRICS & GYNECOLOGY

## 2019-03-18 PROCEDURE — 36415 COLL VENOUS BLD VENIPUNCTURE: CPT | Performed by: OBSTETRICS & GYNECOLOGY

## 2019-03-18 SDOH — HEALTH STABILITY: MENTAL HEALTH: HOW OFTEN DO YOU HAVE A DRINK CONTAINING ALCOHOL?: NEVER

## 2019-03-18 NOTE — LETTER
March 26, 2019    Raúl Mills  [address]   [facility] [address]    Dear ,  This letter is regarding your recent Pap smear (cervical cancer screening) and Human Papillomavirus (HPV) test.  We are happy to inform you that your Pap smear result is normal. Cervical cancer is closely linked with certain types of HPV. Your results showed no evidence of high-risk HPV.  We recommend you return in 1 year for your next pap smear.  You will still need to return to the clinic every year for an annual exam and other preventive tests.  If you have additional questions regarding this result, please call our registered nurse, Belle at [phone].  Sincerely,    Jxaon Bell MD/floyd

## 2019-03-18 NOTE — PROGRESS NOTES
SUBJECTIVE:                                                   Raúl Mills is a 64 year old female who presents to clinic today for the following health issue(s):  Patient presents with:  Consult: discuss abnormal bleeding x 2 months, red blood but dark large clots       HPI: The patient is a 64-year-old  4 who was seen at this time for continual perimenopausal dysfunctional bleeding.  She has never gone more than 6-9 months without doing some bleeding and not ever been completely menopausal.  She states that she had an ultrasound 5 years ago that was unremarkable.  She can bleed for many days in a row and passed large clots.  She has not had any recent Pap screening.      No LMP recorded..   Patient is not sexually active, No obstetric history on file..  Using none and not sexually active for contraception.    reports that  has never smoked. she has never used smokeless tobacco.    STD testing offered?  Declined    Health maintenance updated:  yes    Today's PHQ-2 Score: No flowsheet data found.  Today's PHQ-9 Score: No flowsheet data found.  Today's CHRIS-7 Score: No flowsheet data found.    Problem list and histories reviewed & adjusted, as indicated.  Additional history: as documented.    There is no problem list on file for this patient.    Past Surgical History:   Procedure Laterality Date     GALLBLADDER SURGERY        Social History     Tobacco Use     Smoking status: Never Smoker     Smokeless tobacco: Never Used   Substance Use Topics     Alcohol use: No     Frequency: Never           No current outpatient medications on file.     No current facility-administered medications for this visit.      No Known Allergies    ROS:  12 point review of systems negative other than symptoms noted below.  Constitutional: Weight Loss  Head: Ringing  Cardiovascular: Palpitations  Gastrointestinal: Heartburn and Nausea  Genitourinary: Heavy Bleeding with Period and Irregular Menses  Musculoskeletal: Joint  Pain    OBJECTIVE:     /76   Wt 81.6 kg (180 lb)   There is no height or weight on file to calculate BMI.    Exam:  Constitutional:  Appearance: Well nourished, well developed alert, in no acute distress  Gastrointestinal:  Abdominal Examination:  Abdomen nontender to palpation, tone normal without rigidity or guarding, no masses present, umbilicus without lesions; Liver/Spleen:  No hepatomegaly present, liver nontender to palpation; Hernias:  No hernias present  Lymphatic: Lymph Nodes:  No other lymphadenopathy present  Skin:General Inspection:  No rashes present, no lesions present, no areas of discoloration; Genitalia and Groin:  No rashes present, no lesions present, no areas of discoloration, no masses present.  Neurologic/Psychiatric:  Mental Status:  Oriented X3   Pelvic Exam:  External Genitalia:     Normal appearance for age, no discharge present, no tenderness present, no inflammatory lesions present, color normal  Vagina:     Normal vaginal vault without central or paravaginal defects, no discharge present, no inflammatory lesions present, no masses present  Bladder:     Nontender to palpation  Urethra:   Urethral Body:  Urethra palpation normal, urethra structural support normal   Urethral Meatus:  No erythema or lesions present  Cervix:     Appearance healthy, no lesions present, nontender to palpation, no bleeding present  Uterus:     Uterus: firm, normal sized and tender, midplane in position.   Adnexa:     No adnexal tenderness present, no adnexal masses present  Perineum:     Perineum within normal limits, no evidence of trauma, no rashes or skin lesions present  Anus:     Anus within normal limits, no hemorrhoids present  Inguinal Lymph Nodes:     No lymphadenopathy present  Pubic Hair:     Normal pubic hair distribution for age  Genitalia and Groin:     No rashes present, no lesions present, no areas of discoloration, no masses present       In-Clinic Test Results:      ASSESSMENT/PLAN:                                                         64-year-old [place]ic multigravida with perimenopausal dysfunctional bleeding or unattended postmenopausal bleeding.  There is no cervical polyp present.  Her uterus is globular mildly enlarged and tender.  We will check a hemoglobin today and get a vaginal probe ultrasound as soon as possible.        Jaxon Bell MD  [facility]

## 2019-03-19 NOTE — PROGRESS NOTES
"    SUBJECTIVE:                                                   Raúl Mills is a 64 year old female who presents to clinic today for the following health issue(s):  Patient presents with:  Ultrasound: Postmeno bleeding        HPI: The patient is seen in follow-up of late perimenopausal dysfunctional bleeding or postmenopausal bleeding.  She has never had complete amenorrhea.  Ultrasound is scheduled today.      No LMP recorded. Patient is postmenopausal..   Patient is not sexually active, .  Using not sexually active for contraception.    reports that  has never smoked. she has never used smokeless tobacco.    STD testing offered?  Declined    Health maintenance updated:  yes    Today's PHQ-2 Score: No flowsheet data found.  Today's PHQ-9 Score: No flowsheet data found.  Today's CHRIS-7 Score: No flowsheet data found.    Problem list and histories reviewed & adjusted, as indicated.  Additional history: as documented.    There is no problem list on file for this patient.    Past Surgical History:   Procedure Laterality Date     GALLBLADDER SURGERY        Social History     Tobacco Use     Smoking status: Never Smoker     Smokeless tobacco: Never Used   Substance Use Topics     Alcohol use: No     Frequency: Never           Current Outpatient Medications   Medication Sig     metFORMIN (GLUCOPHAGE) 500 MG tablet Take 500 mg by mouth 2 times daily (with meals)     omeprazole (PRILOSEC) 40 MG DR capsule Take 40 mg by mouth daily     No current facility-administered medications for this visit.      No Known Allergies    ROS:  12 point review of systems negative other than symptoms noted below.    OBJECTIVE:     /70   Pulse 72   Ht 1.626 m (5' 4\")   Wt 81.6 kg (180 lb)   BMI 30.90 kg/m    Body mass index is 30.9 kg/m .    Exam:  Constitutional:  Appearance: Well nourished, well developed alert, in no acute distress      In-Clinic Test Results:  Results for orders placed or performed in visit on " 03/20/19 (from the past 24 hour(s))   US Pelvic Limited    Narrative    US Pelvic Limited   Order #: [id number] Accession #: [id number]   Study Notes        Marianne Rider on 3/20/2019  2:54 PM   Gynecological Ultrasound Report  Pelvic U/S - Transvaginal    [facility]  Referring Provider: Dr. Jaxon Bell  Sonographer: Marianne Rider RDMS  Indication: Postmenopausal bleeding  LMP (mm/dd/yyyy): Postmenopausal  History:   Gynecological Ultrasonography:   Uterus: anteverted  Size: 7.72 x 5.42 x 5.19cm.     Endometrium: Thickness total 2.42mm  Findings: Normal  Right Ovary: 2.04 x 1.14 x .88cm.    Left Ovary: 1.70 x 1.23 x .76cm.   Cul de Sac/Pouch of Ha: No FF      Impression: Thin endometrial lining with no sign of polyp  ___________________________________________________________________________  _______           Discussed here       ASSESSMENT/PLAN:                                                      Patient with either late perimenopausal dysfunctional bleeding or postmenopausal bleeding with a negative ultrasound for polyps hyperplasia or cancer.  We have given her the option of further observation versus an endometrial biopsy for tissue sampling.  She will return for that when she can.  She will pretreat with Deanna Bell MD  [facility]

## 2019-03-20 ENCOUNTER — ANCILLARY PROCEDURE (OUTPATIENT)
Dept: ULTRASOUND IMAGING | Facility: CLINIC | Age: 64
End: 2019-03-20
Attending: OBSTETRICS & GYNECOLOGY
Payer: MEDICAID

## 2019-03-20 ENCOUNTER — OFFICE VISIT (OUTPATIENT)
Dept: OBGYN | Facility: CLINIC | Age: 64
End: 2019-03-20
Attending: OBSTETRICS & GYNECOLOGY
Payer: MEDICAID

## 2019-03-20 VITALS
DIASTOLIC BLOOD PRESSURE: 70 MMHG | SYSTOLIC BLOOD PRESSURE: 122 MMHG | BODY MASS INDEX: 30.73 KG/M2 | HEART RATE: 72 BPM | HEIGHT: 64 IN | WEIGHT: 180 LBS

## 2019-03-20 DIAGNOSIS — N95.0 PMB (POSTMENOPAUSAL BLEEDING): ICD-10-CM

## 2019-03-20 DIAGNOSIS — N93.8 DUB (DYSFUNCTIONAL UTERINE BLEEDING): Primary | ICD-10-CM

## 2019-03-20 PROCEDURE — 99213 OFFICE O/P EST LOW 20 MIN: CPT | Performed by: OBSTETRICS & GYNECOLOGY

## 2019-03-20 PROCEDURE — 76857 US EXAM PELVIC LIMITED: CPT | Performed by: OBSTETRICS & GYNECOLOGY

## 2019-03-20 PROCEDURE — T1013 SIGN LANG/ORAL INTERPRETER: HCPCS | Mod: U3 | Performed by: OBSTETRICS & GYNECOLOGY

## 2019-03-20 RX ORDER — OMEPRAZOLE 40 MG/1
40 CAPSULE, DELAYED RELEASE ORAL DAILY
COMMUNITY

## 2019-03-20 ASSESSMENT — MIFFLIN-ST. JEOR: SCORE: 1361.47

## 2019-03-21 LAB
COPATH REPORT: NORMAL
PAP: NORMAL

## 2019-03-22 LAB
FINAL DIAGNOSIS: NORMAL
HPV HR 12 DNA CVX QL NAA+PROBE: NEGATIVE
HPV16 DNA SPEC QL NAA+PROBE: NEGATIVE
HPV18 DNA SPEC QL NAA+PROBE: NEGATIVE
SPECIMEN DESCRIPTION: NORMAL
SPECIMEN SOURCE CVX/VAG CYTO: NORMAL

## 2020-11-30 ENCOUNTER — TRANSCRIBE ORDERS (OUTPATIENT)
Dept: OTHER | Age: 65
End: 2020-11-30

## 2020-11-30 ENCOUNTER — TELEPHONE (OUTPATIENT)
Dept: GASTROENTEROLOGY | Facility: CLINIC | Age: 65
End: 2020-11-30

## 2020-11-30 NOTE — TELEPHONE ENCOUNTER
Gifty Leyva from [facility] called about getting Raúl Mills scheduled for an appt. The order that was send was for a consult. I did transfer the fax over to the clinic to get this scheduled.   Patient has a person that helps her schedule the appt.     Please call [phone] Lulu  To help schedule appt for Raúl.

## 2023-09-29 NOTE — ADDENDUM NOTE
Addended by: ROCKY RUANO JR. on: 2/8/2018 07:56 AM     Modules accepted: Orders     PT given lunch tray at this time.

## 2024-05-30 ENCOUNTER — MEDICAL CORRESPONDENCE (OUTPATIENT)
Dept: HEALTH INFORMATION MANAGEMENT | Facility: CLINIC | Age: 69
End: 2024-05-30
Payer: COMMERCIAL

## 2024-05-30 DIAGNOSIS — R00.2 PALPITATIONS: ICD-10-CM

## 2024-05-30 DIAGNOSIS — R06.02 SOB (SHORTNESS OF BREATH) ON EXERTION: Primary | ICD-10-CM

## 2024-05-31 ENCOUNTER — HOSPITAL ENCOUNTER (EMERGENCY)
Facility: CLINIC | Age: 69
Discharge: HOME | End: 2024-05-31
Attending: EMERGENCY MEDICINE | Admitting: EMERGENCY MEDICINE
Payer: COMMERCIAL

## 2024-05-31 ENCOUNTER — ORDERS ONLY (AUTO-RELEASED) (OUTPATIENT)
Dept: EMERGENCY MEDICINE | Facility: CLINIC | Age: 69
End: 2024-05-31

## 2024-05-31 VITALS
DIASTOLIC BLOOD PRESSURE: 55 MMHG | HEART RATE: 68 BPM | OXYGEN SATURATION: 96 % | RESPIRATION RATE: 11 BRPM | WEIGHT: 172 LBS | SYSTOLIC BLOOD PRESSURE: 132 MMHG | BODY MASS INDEX: 32.47 KG/M2 | TEMPERATURE: 98.6 F | HEIGHT: 61 IN

## 2024-05-31 DIAGNOSIS — N39.0 URINARY TRACT INFECTION WITHOUT HEMATURIA, SITE UNSPECIFIED: ICD-10-CM

## 2024-05-31 DIAGNOSIS — R00.2 PALPITATIONS: ICD-10-CM

## 2024-05-31 LAB
ALBUMIN UR-MCNC: NEGATIVE MG/DL
ANION GAP SERPL CALCULATED.3IONS-SCNC: 10 MMOL/L (ref 7–15)
APPEARANCE UR: CLEAR
ATRIAL RATE - MUSE: 71 BPM
BASOPHILS # BLD AUTO: 0 10E3/UL (ref 0–0.2)
BASOPHILS NFR BLD AUTO: 1 %
BILIRUB UR QL STRIP: NEGATIVE
BUN SERPL-MCNC: 8.2 MG/DL (ref 8–23)
CALCIUM SERPL-MCNC: 9 MG/DL (ref 8.8–10.2)
CHLORIDE SERPL-SCNC: 105 MMOL/L (ref 98–107)
COLOR UR AUTO: ABNORMAL
CREAT SERPL-MCNC: 0.65 MG/DL (ref 0.51–0.95)
DEPRECATED HCO3 PLAS-SCNC: 25 MMOL/L (ref 22–29)
DIASTOLIC BLOOD PRESSURE - MUSE: NORMAL MMHG
EGFRCR SERPLBLD CKD-EPI 2021: >90 ML/MIN/1.73M2
EOSINOPHIL # BLD AUTO: 0.1 10E3/UL (ref 0–0.7)
EOSINOPHIL NFR BLD AUTO: 2 %
ERYTHROCYTE [DISTWIDTH] IN BLOOD BY AUTOMATED COUNT: 13 % (ref 10–15)
GLUCOSE SERPL-MCNC: 147 MG/DL (ref 70–99)
GLUCOSE UR STRIP-MCNC: NEGATIVE MG/DL
HCT VFR BLD AUTO: 37.6 % (ref 35–47)
HGB BLD-MCNC: 13.1 G/DL (ref 11.7–15.7)
HGB UR QL STRIP: NEGATIVE
IMM GRANULOCYTES # BLD: 0 10E3/UL
IMM GRANULOCYTES NFR BLD: 0 %
INTERPRETATION ECG - MUSE: NORMAL
KETONES UR STRIP-MCNC: NEGATIVE MG/DL
LEUKOCYTE ESTERASE UR QL STRIP: ABNORMAL
LYMPHOCYTES # BLD AUTO: 1.9 10E3/UL (ref 0.8–5.3)
LYMPHOCYTES NFR BLD AUTO: 34 %
MAGNESIUM SERPL-MCNC: 4.3 MG/DL (ref 1.7–2.3)
MCH RBC QN AUTO: 30.8 PG (ref 26.5–33)
MCHC RBC AUTO-ENTMCNC: 34.8 G/DL (ref 31.5–36.5)
MCV RBC AUTO: 88 FL (ref 78–100)
MONOCYTES # BLD AUTO: 0.4 10E3/UL (ref 0–1.3)
MONOCYTES NFR BLD AUTO: 7 %
NEUTROPHILS # BLD AUTO: 3.2 10E3/UL (ref 1.6–8.3)
NEUTROPHILS NFR BLD AUTO: 56 %
NITRATE UR QL: NEGATIVE
NRBC # BLD AUTO: 0 10E3/UL
NRBC BLD AUTO-RTO: 0 /100
P AXIS - MUSE: 53 DEGREES
PH UR STRIP: 6 [PH] (ref 5–7)
PLATELET # BLD AUTO: 204 10E3/UL (ref 150–450)
POTASSIUM SERPL-SCNC: 4 MMOL/L (ref 3.4–5.3)
PR INTERVAL - MUSE: 152 MS
QRS DURATION - MUSE: 78 MS
QT - MUSE: 362 MS
QTC - MUSE: 393 MS
R AXIS - MUSE: 16 DEGREES
RBC # BLD AUTO: 4.26 10E6/UL (ref 3.8–5.2)
RBC URINE: 2 /HPF
SODIUM SERPL-SCNC: 140 MMOL/L (ref 135–145)
SP GR UR STRIP: 1 (ref 1–1.03)
SQUAMOUS EPITHELIAL: 2 /HPF
SYSTOLIC BLOOD PRESSURE - MUSE: NORMAL MMHG
T AXIS - MUSE: 3 DEGREES
TROPONIN T SERPL HS-MCNC: <6 NG/L
TSH SERPL DL<=0.005 MIU/L-ACNC: 2.21 UIU/ML (ref 0.3–4.2)
UROBILINOGEN UR STRIP-MCNC: NORMAL MG/DL
VENTRICULAR RATE- MUSE: 71 BPM
WBC # BLD AUTO: 5.7 10E3/UL (ref 4–11)
WBC URINE: 8 /HPF

## 2024-05-31 PROCEDURE — 999N000285 HC STATISTIC VASC ACCESS LAB DRAW WITH PIV START

## 2024-05-31 PROCEDURE — 85025 COMPLETE CBC W/AUTO DIFF WBC: CPT | Performed by: EMERGENCY MEDICINE

## 2024-05-31 PROCEDURE — 99284 EMERGENCY DEPT VISIT MOD MDM: CPT | Performed by: EMERGENCY MEDICINE

## 2024-05-31 PROCEDURE — 999N000040 HC STATISTIC CONSULT NO CHARGE VASC ACCESS

## 2024-05-31 PROCEDURE — 84443 ASSAY THYROID STIM HORMONE: CPT | Performed by: EMERGENCY MEDICINE

## 2024-05-31 PROCEDURE — 93005 ELECTROCARDIOGRAM TRACING: CPT | Performed by: EMERGENCY MEDICINE

## 2024-05-31 PROCEDURE — 84484 ASSAY OF TROPONIN QUANT: CPT | Performed by: EMERGENCY MEDICINE

## 2024-05-31 PROCEDURE — 80048 BASIC METABOLIC PNL TOTAL CA: CPT | Performed by: EMERGENCY MEDICINE

## 2024-05-31 PROCEDURE — 999N000127 HC STATISTIC PERIPHERAL IV START W US GUIDANCE

## 2024-05-31 PROCEDURE — 83735 ASSAY OF MAGNESIUM: CPT | Performed by: EMERGENCY MEDICINE

## 2024-05-31 PROCEDURE — 81001 URINALYSIS AUTO W/SCOPE: CPT | Performed by: EMERGENCY MEDICINE

## 2024-05-31 PROCEDURE — 93010 ELECTROCARDIOGRAM REPORT: CPT | Performed by: EMERGENCY MEDICINE

## 2024-05-31 PROCEDURE — 87086 URINE CULTURE/COLONY COUNT: CPT | Performed by: EMERGENCY MEDICINE

## 2024-05-31 RX ORDER — CEFDINIR 300 MG/1
300 CAPSULE ORAL 2 TIMES DAILY
Qty: 10 CAPSULE | Refills: 0 | Status: SHIPPED | OUTPATIENT
Start: 2024-05-31 | End: 2024-05-31

## 2024-05-31 RX ORDER — PANTOPRAZOLE SODIUM 40 MG/1
40 TABLET, DELAYED RELEASE ORAL DAILY
Qty: 30 TABLET | Refills: 0 | Status: SHIPPED | OUTPATIENT
Start: 2024-05-31 | End: 2024-06-30

## 2024-05-31 RX ORDER — CEFDINIR 300 MG/1
300 CAPSULE ORAL 2 TIMES DAILY
Qty: 10 CAPSULE | Refills: 0 | Status: SHIPPED | OUTPATIENT
Start: 2024-05-31

## 2024-05-31 ASSESSMENT — COLUMBIA-SUICIDE SEVERITY RATING SCALE - C-SSRS
6. HAVE YOU EVER DONE ANYTHING, STARTED TO DO ANYTHING, OR PREPARED TO DO ANYTHING TO END YOUR LIFE?: NO
1. IN THE PAST MONTH, HAVE YOU WISHED YOU WERE DEAD OR WISHED YOU COULD GO TO SLEEP AND NOT WAKE UP?: NO
2. HAVE YOU ACTUALLY HAD ANY THOUGHTS OF KILLING YOURSELF IN THE PAST MONTH?: NO

## 2024-05-31 ASSESSMENT — ACTIVITIES OF DAILY LIVING (ADL)
ADLS_ACUITY_SCORE: 35
ADLS_ACUITY_SCORE: 33
ADLS_ACUITY_SCORE: 35
ADLS_ACUITY_SCORE: 35

## 2024-05-31 NOTE — CONSULTS
"Consult received for Vascular access care.  See LDA for details.  For additional needs place \"Nursing to Consult for Vascular Access\" [id number] order in EPIC.      "

## 2024-05-31 NOTE — ED TRIAGE NOTES
Patient presents due to palpitations for the past 3 weeks. Patient presented to Urgent Care today and was told to go to Emergency Room for Holter monitor or Zio Patch. Patient denies chest pain.     Triage Assessment (Adult)       Row Name 05/31/24 1547          Triage Assessment    Airway WDL WDL        Respiratory WDL    Respiratory WDL WDL        Skin Circulation/Temperature WDL    Skin Circulation/Temperature WDL WDL        Cardiac WDL    Cardiac WDL X;rhythm     Pulse Rate & Regularity tachycardic        Peripheral/Neurovascular WDL    Peripheral Neurovascular WDL WDL        Cognitive/Neuro/Behavioral WDL    Cognitive/Neuro/Behavioral WDL WDL

## 2024-05-31 NOTE — ED PROVIDER NOTES
"ED Provider Note  [facility]      History     Chief Complaint   Patient presents with    Palpitations     Patient presents due to palpitations for the past 3 weeks. Patient presented to Urgent Care today and was told to go to Emergency Room for Holter monitor or Zio Patch. Patient denies chest pain.     HPI  Raúl Mills is a 69 year old female who presents to the emergency department due to palpitations x 3 weeks.  She reports that she is having episodes of racing heartbeat which will last for hours at a time and seem to occur mostly at night when she is going to bed.  No syncope, chest pain or shortness of breath.  She reports she was having these today and went to urgent care and was referred to the emergency department where they recommended she have a Zio patch monitor.  No medication changes, vomiting, diarrhea, or other complaints.    Past Medical History  Past Medical History:   Diagnosis Date    Arthritis     Diabetes (H)     High cholesterol     Osteoporosis      Past Surgical History:   Procedure Laterality Date    GALLBLADDER SURGERY  2000     cefdinir (OMNICEF) 300 MG capsule  metFORMIN (GLUCOPHAGE) 500 MG tablet  omeprazole (PRILOSEC) 40 MG DR capsule  pantoprazole (PROTONIX) 40 MG EC tablet      No Known Allergies  Family History  No family history on file.  Social History   Social History     Tobacco Use    Smoking status: Never    Smokeless tobacco: Never   Substance Use Topics    Alcohol use: No    Drug use: No      A medically appropriate review of systems was performed with pertinent positives and negatives noted in the HPI, and all other systems negative.    Physical Exam   BP: 129/82  Pulse: 77  Temp: 98.6  F (37  C)  Resp: 18  Height: 154.9 cm (5' 1\")  Weight: 78 kg (172 lb)  SpO2: 96 %  Physical Exam  Vitals and nursing note reviewed.   Constitutional:       General: She is not in acute distress.     Appearance: She is well-developed. She is not ill-appearing, " toxic-appearing or diaphoretic.   HENT:      Head: Normocephalic and atraumatic.      Mouth/Throat:      Lips: Pink.      Mouth: Mucous membranes are moist.      Pharynx: Oropharynx is clear. No oropharyngeal exudate.   Eyes:      General: Lids are normal. No scleral icterus.     Extraocular Movements: Extraocular movements intact.      Right eye: No nystagmus.      Left eye: No nystagmus.      Conjunctiva/sclera: Conjunctivae normal.      Pupils: Pupils are equal, round, and reactive to light.   Neck:      Thyroid: No thyromegaly.      Vascular: No JVD.      Trachea: No tracheal deviation.   Cardiovascular:      Rate and Rhythm: Normal rate and regular rhythm.      Pulses: Normal pulses.      Heart sounds: Normal heart sounds. No murmur heard.     No friction rub. No gallop.   Pulmonary:      Effort: Pulmonary effort is normal. No respiratory distress.      Breath sounds: Normal breath sounds.   Abdominal:      General: Bowel sounds are normal. There is no distension.      Palpations: Abdomen is soft. There is no mass.      Tenderness: There is no abdominal tenderness. There is no guarding or rebound.   Musculoskeletal:         General: No tenderness. Normal range of motion.      Cervical back: Normal range of motion and neck supple. No erythema or rigidity.      Right lower leg: No edema.      Left lower leg: No edema.   Lymphadenopathy:      Cervical: No cervical adenopathy.   Skin:     General: Skin is warm and dry.      Capillary Refill: Capillary refill takes less than 2 seconds.      Coloration: Skin is not pale.      Findings: No erythema or rash.   Neurological:      Mental Status: She is alert and oriented to person, place, and time.      Cranial Nerves: No cranial nerve deficit.      Sensory: No sensory deficit.      Motor: Motor function is intact.   Psychiatric:         Mood and Affect: Mood and affect normal.         Speech: Speech normal.         Behavior: Behavior normal.           ED Course,  Procedures, & Data      Procedures            EKG Interpretation:      Interpreted by Malcolm Zepeda MD    Symptoms at time of EKG: Palpitations  Rhythm: normal sinus   Rate: 71  Ectopy: none  Conduction: normal  ST Segments/ T Waves: T wave flattening Lateral and T wave inversion III and aVF  Q Waves: none  Comparison to prior: No old EKG available    Clinical Impression: non-specific EKG                 Results for orders placed or performed during the hospital encounter of 05/31/24   Basic metabolic panel     Status: Abnormal   Result Value Ref Range    Sodium 140 135 - 145 mmol/L    Potassium 4.0 3.4 - 5.3 mmol/L    Chloride 105 98 - 107 mmol/L    Carbon Dioxide (CO2) 25 22 - 29 mmol/L    Anion Gap 10 7 - 15 mmol/L    Urea Nitrogen 8.2 8.0 - 23.0 mg/dL    Creatinine 0.65 0.51 - 0.95 mg/dL    GFR Estimate >90 >60 mL/min/1.73m2    Calcium 9.0 8.8 - 10.2 mg/dL    Glucose 147 (H) 70 - 99 mg/dL   Magnesium     Status: Abnormal   Result Value Ref Range    Magnesium 4.3 (H) 1.7 - 2.3 mg/dL   TSH with free T4 reflex     Status: Normal   Result Value Ref Range    TSH 2.21 0.30 - 4.20 uIU/mL   Troponin T, High Sensitivity     Status: Normal   Result Value Ref Range    Troponin T, High Sensitivity <6 <=14 ng/L   CBC with platelets and differential     Status: None   Result Value Ref Range    WBC Count 5.7 4.0 - 11.0 10e3/uL    RBC Count 4.26 3.80 - 5.20 10e6/uL    Hemoglobin 13.1 11.7 - 15.7 g/dL    Hematocrit 37.6 35.0 - 47.0 %    MCV 88 78 - 100 fL    MCH 30.8 26.5 - 33.0 pg    MCHC 34.8 31.5 - 36.5 g/dL    RDW 13.0 10.0 - 15.0 %    Platelet Count 204 150 - 450 10e3/uL    % Neutrophils 56 %    % Lymphocytes 34 %    % Monocytes 7 %    % Eosinophils 2 %    % Basophils 1 %    % Immature Granulocytes 0 %    NRBCs per 100 WBC 0 <1 /100    Absolute Neutrophils 3.2 1.6 - 8.3 10e3/uL    Absolute Lymphocytes 1.9 0.8 - 5.3 10e3/uL    Absolute Monocytes 0.4 0.0 - 1.3 10e3/uL    Absolute Eosinophils 0.1 0.0 - 0.7 10e3/uL     Absolute Basophils 0.0 0.0 - 0.2 10e3/uL    Absolute Immature Granulocytes 0.0 <=0.4 10e3/uL    Absolute NRBCs 0.0 10e3/uL   UA with Microscopic reflex to Culture     Status: Abnormal    Specimen: Urine, Clean Catch   Result Value Ref Range    Color Urine Straw Colorless, Straw, Light Yellow, Yellow    Appearance Urine Clear Clear    Glucose Urine Negative Negative mg/dL    Bilirubin Urine Negative Negative    Ketones Urine Negative Negative mg/dL    Specific Gravity Urine 1.005 1.003 - 1.035    Blood Urine Negative Negative    pH Urine 6.0 5.0 - 7.0    Protein Albumin Urine Negative Negative mg/dL    Urobilinogen Urine Normal Normal, 2.0 mg/dL    Nitrite Urine Negative Negative    Leukocyte Esterase Urine Moderate (A) Negative    RBC Urine 2 <=2 /HPF    WBC Urine 8 (H) <=5 /HPF    Squamous Epithelials Urine 2 (H) <=1 /HPF    Narrative    Urine Culture ordered based on laboratory criteria   EKG 12-lead, tracing only     Status: None   Result Value Ref Range    Systolic Blood Pressure  mmHg    Diastolic Blood Pressure  mmHg    Ventricular Rate 71 BPM    Atrial Rate 71 BPM    SC Interval 152 ms    QRS Duration 78 ms     ms    QTc 393 ms    P Axis 53 degrees    R AXIS 16 degrees    T Axis 3 degrees    Interpretation ECG       Sinus rhythm  Nonspecific T wave abnormality  Abnormal ECG  Unconfirmed report - interpretation of this ECG is computer generated - see medical record for final interpretation  Confirmed by - EMERGENCY ROOM, PHYSICIAN ([id number]),  JERMAINE SOUSA ([id number]) on 5/31/2024 9:42:16 PM     CBC with platelets differential     Status: None    Narrative    The following orders were created for panel order CBC with platelets differential.  Procedure                               Abnormality         Status                     ---------                               -----------         ------                     CBC with platelets and d...[[id number]]                      Final result                  Please view results for these tests on the individual orders.     Medications - No data to display  Labs Ordered and Resulted from Time of ED Arrival to Time of ED Departure   BASIC METABOLIC PANEL - Abnormal       Result Value    Sodium 140      Potassium 4.0      Chloride 105      Carbon Dioxide (CO2) 25      Anion Gap 10      Urea Nitrogen 8.2      Creatinine 0.65      GFR Estimate >90      Calcium 9.0      Glucose 147 (*)    MAGNESIUM - Abnormal    Magnesium 4.3 (*)    ROUTINE UA WITH MICROSCOPIC REFLEX TO CULTURE - Abnormal    Color Urine Straw      Appearance Urine Clear      Glucose Urine Negative      Bilirubin Urine Negative      Ketones Urine Negative      Specific Gravity Urine 1.005      Blood Urine Negative      pH Urine 6.0      Protein Albumin Urine Negative      Urobilinogen Urine Normal      Nitrite Urine Negative      Leukocyte Esterase Urine Moderate (*)     RBC Urine 2      WBC Urine 8 (*)     Squamous Epithelials Urine 2 (*)    TSH WITH FREE T4 REFLEX - Normal    TSH 2.21     TROPONIN T, HIGH SENSITIVITY - Normal    Troponin T, High Sensitivity <6     CBC WITH PLATELETS AND DIFFERENTIAL    WBC Count 5.7      RBC Count 4.26      Hemoglobin 13.1      Hematocrit 37.6      MCV 88      MCH 30.8      MCHC 34.8      RDW 13.0      Platelet Count 204      % Neutrophils 56      % Lymphocytes 34      % Monocytes 7      % Eosinophils 2      % Basophils 1      % Immature Granulocytes 0      NRBCs per 100 WBC 0      Absolute Neutrophils 3.2      Absolute Lymphocytes 1.9      Absolute Monocytes 0.4      Absolute Eosinophils 0.1      Absolute Basophils 0.0      Absolute Immature Granulocytes 0.0      Absolute NRBCs 0.0     URINE CULTURE     No orders to display              Assessment & Plan      This patient presented the emergency department with complaints of palpitations.  Twelve-lead EKG demonstrated no evidence of prolonged QT interval, Brugada syndrome, Uli-Parkinson-White, AV block or other  abnormality.  No acute ischemic changes are noted.  Troponin is negative.  No significant electrolyte abnormalities are noted.  Magnesium level was mildly elevated, but this is a lab error as several patients had the same value for their magnesium levels.  She also, later complained that she was having some discomfort with urination so urinalysis was obtained which did demonstrate some white blood cells in her sample.  She will be treated with cefdinir for this.  Zio patch monitor was ordered and will be mailed out to the patient's daughter.  She was told to return for any further problems and was discharged in good condition.    I have reviewed the nursing notes. I have reviewed the findings, diagnosis, plan and need for follow up with the patient.    Discharge Medication List as of 5/31/2024  9:31 PM        START taking these medications    Details   pantoprazole (PROTONIX) 40 MG EC tablet Take 1 tablet (40 mg) by mouth daily for 30 doses, Disp-30 tablet, R-0, Local Print             Final diagnoses:   Palpitations   Urinary tract infection without hematuria, site unspecified       Malcolm Zepeda MD  [facility] EMERGENCY DEPARTMENT  5/31/2024     Malcolm Zepeda MD  05/31/24 [id number]

## 2024-06-01 NOTE — ED NOTES
Patient provided discharge paperwork and instructions. Educated on prescribed medications, follow up care, and return precautions. Agrees to learning. Multiple family at bedside for education, also agree. Ambulates to exit with even and steady gait.

## 2024-06-01 NOTE — DISCHARGE INSTRUCTIONS
Follow-up with your primary clinic.    Cefdinir as directed.  It does look like you have a mild urinary tract infection.  This is an antibiotic that should help this.    Zio patch monitor will be mailed out to you.  Use this as directed and follow-up with your primary clinic for results.    Return to the emergency department for any problems.

## 2024-06-02 LAB — BACTERIA UR CULT: NORMAL

## 2024-06-11 ENCOUNTER — HOSPITAL ENCOUNTER (OUTPATIENT)
Dept: CT IMAGING | Facility: CLINIC | Age: 69
Discharge: HOME | End: 2024-06-11
Payer: COMMERCIAL

## 2024-06-11 ENCOUNTER — HOSPITAL ENCOUNTER (OUTPATIENT)
Dept: CARDIOLOGY | Facility: CLINIC | Age: 69
Discharge: HOME | End: 2024-06-11
Payer: COMMERCIAL

## 2024-06-11 DIAGNOSIS — I25.10 ARTERIOSCLEROTIC CORONARY ARTERY DISEASE: ICD-10-CM

## 2024-06-11 DIAGNOSIS — R06.02 SOB (SHORTNESS OF BREATH) ON EXERTION: ICD-10-CM

## 2024-06-11 LAB — LVEF ECHO: NORMAL

## 2024-06-11 PROCEDURE — 75571 CT HRT W/O DYE W/CA TEST: CPT

## 2024-06-11 PROCEDURE — 255N000002 HC RX 255 OP 636: Performed by: STUDENT IN AN ORGANIZED HEALTH CARE EDUCATION/TRAINING PROGRAM

## 2024-06-11 PROCEDURE — 75571 CT HRT W/O DYE W/CA TEST: CPT | Mod: 26 | Performed by: STUDENT IN AN ORGANIZED HEALTH CARE EDUCATION/TRAINING PROGRAM

## 2024-06-11 PROCEDURE — 93306 TTE W/DOPPLER COMPLETE: CPT | Mod: 26 | Performed by: INTERNAL MEDICINE

## 2024-06-11 PROCEDURE — 999N000208 ECHOCARDIOGRAM COMPLETE

## 2024-06-11 RX ADMIN — HUMAN ALBUMIN MICROSPHERES AND PERFLUTREN 5 ML: 10; .22 INJECTION, SOLUTION INTRAVENOUS at 09:28

## 2025-06-27 ENCOUNTER — MEDICAL CORRESPONDENCE (OUTPATIENT)
Dept: HEALTH INFORMATION MANAGEMENT | Facility: CLINIC | Age: 70
End: 2025-06-27
Payer: COMMERCIAL

## 2025-06-30 ENCOUNTER — TRANSCRIBE ORDERS (OUTPATIENT)
Dept: OTHER | Age: 70
End: 2025-06-30

## 2025-06-30 DIAGNOSIS — R51.9 HA (HEADACHE): Primary | ICD-10-CM

## 2025-06-30 DIAGNOSIS — G89.29 OTHER CHRONIC PAIN: ICD-10-CM

## 2025-07-20 ENCOUNTER — HOSPITAL ENCOUNTER (EMERGENCY)
Facility: CLINIC | Age: 70
Discharge: HOME | End: 2025-07-20
Attending: EMERGENCY MEDICINE
Payer: COMMERCIAL

## 2025-07-20 VITALS
SYSTOLIC BLOOD PRESSURE: 154 MMHG | BODY MASS INDEX: 28.32 KG/M2 | TEMPERATURE: 97.6 F | WEIGHT: 170 LBS | DIASTOLIC BLOOD PRESSURE: 65 MMHG | HEIGHT: 65 IN | OXYGEN SATURATION: 99 % | RESPIRATION RATE: 16 BRPM | HEART RATE: 68 BPM

## 2025-07-20 DIAGNOSIS — N39.0 ACUTE UTI: ICD-10-CM

## 2025-07-20 LAB
ALBUMIN SERPL BCG-MCNC: 4 G/DL (ref 3.5–5.2)
ALBUMIN UR-MCNC: 100 MG/DL
ALP SERPL-CCNC: 64 U/L (ref 40–150)
ALT SERPL W P-5'-P-CCNC: 19 U/L (ref 0–50)
ANION GAP SERPL CALCULATED.3IONS-SCNC: 10 MMOL/L (ref 7–15)
APPEARANCE UR: ABNORMAL
AST SERPL W P-5'-P-CCNC: 16 U/L (ref 0–45)
BASOPHILS # BLD AUTO: 0 10E3/UL (ref 0–0.2)
BASOPHILS NFR BLD AUTO: 0 %
BILIRUB SERPL-MCNC: 0.2 MG/DL
BILIRUB UR QL STRIP: NEGATIVE
BUN SERPL-MCNC: 10.2 MG/DL (ref 8–23)
CALCIUM SERPL-MCNC: 9.1 MG/DL (ref 8.8–10.4)
CAOX CRY #/AREA URNS HPF: ABNORMAL /HPF
CHLORIDE SERPL-SCNC: 103 MMOL/L (ref 98–107)
COLOR UR AUTO: YELLOW
CREAT SERPL-MCNC: 0.7 MG/DL (ref 0.51–0.95)
EGFRCR SERPLBLD CKD-EPI 2021: >90 ML/MIN/1.73M2
EOSINOPHIL # BLD AUTO: 0.1 10E3/UL (ref 0–0.7)
EOSINOPHIL NFR BLD AUTO: 2 %
ERYTHROCYTE [DISTWIDTH] IN BLOOD BY AUTOMATED COUNT: 12.8 % (ref 10–15)
GLUCOSE SERPL-MCNC: 163 MG/DL (ref 70–99)
GLUCOSE UR STRIP-MCNC: NEGATIVE MG/DL
HCO3 SERPL-SCNC: 22 MMOL/L (ref 22–29)
HCT VFR BLD AUTO: 39.2 % (ref 35–47)
HGB BLD-MCNC: 13.3 G/DL (ref 11.7–15.7)
HGB UR QL STRIP: ABNORMAL
IMM GRANULOCYTES # BLD: 0 10E3/UL
IMM GRANULOCYTES NFR BLD: 0 %
KETONES UR STRIP-MCNC: NEGATIVE MG/DL
LEUKOCYTE ESTERASE UR QL STRIP: ABNORMAL
LIPASE SERPL-CCNC: 26 U/L (ref 13–60)
LYMPHOCYTES # BLD AUTO: 2 10E3/UL (ref 0.8–5.3)
LYMPHOCYTES NFR BLD AUTO: 24 %
MCH RBC QN AUTO: 30 PG (ref 26.5–33)
MCHC RBC AUTO-ENTMCNC: 33.9 G/DL (ref 31.5–36.5)
MCV RBC AUTO: 88 FL (ref 78–100)
MONOCYTES # BLD AUTO: 0.6 10E3/UL (ref 0–1.3)
MONOCYTES NFR BLD AUTO: 7 %
MUCOUS THREADS #/AREA URNS LPF: PRESENT /LPF
NEUTROPHILS # BLD AUTO: 5.8 10E3/UL (ref 1.6–8.3)
NEUTROPHILS NFR BLD AUTO: 67 %
NITRATE UR QL: POSITIVE
NRBC # BLD AUTO: 0 10E3/UL
NRBC BLD AUTO-RTO: 0 /100
PH UR STRIP: 6 [PH] (ref 5–7)
PLATELET # BLD AUTO: 206 10E3/UL (ref 150–450)
POTASSIUM SERPL-SCNC: 4 MMOL/L (ref 3.4–5.3)
PROT SERPL-MCNC: 6.9 G/DL (ref 6.4–8.3)
RBC # BLD AUTO: 4.44 10E6/UL (ref 3.8–5.2)
RBC URINE: 124 /HPF
SODIUM SERPL-SCNC: 135 MMOL/L (ref 135–145)
SP GR UR STRIP: 1.02 (ref 1–1.03)
TRANSITIONAL EPI: <1 /HPF
TROPONIN T SERPL HS-MCNC: <6 NG/L
UROBILINOGEN UR STRIP-MCNC: NORMAL MG/DL
WBC # BLD AUTO: 8.6 10E3/UL (ref 4–11)
WBC CLUMPS #/AREA URNS HPF: PRESENT /HPF
WBC URINE: >182 /HPF

## 2025-07-20 PROCEDURE — 83690 ASSAY OF LIPASE: CPT | Performed by: EMERGENCY MEDICINE

## 2025-07-20 PROCEDURE — 96365 THER/PROPH/DIAG IV INF INIT: CPT | Performed by: EMERGENCY MEDICINE

## 2025-07-20 PROCEDURE — 80053 COMPREHEN METABOLIC PANEL: CPT | Performed by: EMERGENCY MEDICINE

## 2025-07-20 PROCEDURE — 250N000011 HC RX IP 250 OP 636: Performed by: EMERGENCY MEDICINE

## 2025-07-20 PROCEDURE — 93010 ELECTROCARDIOGRAM REPORT: CPT | Performed by: EMERGENCY MEDICINE

## 2025-07-20 PROCEDURE — 85041 AUTOMATED RBC COUNT: CPT | Performed by: EMERGENCY MEDICINE

## 2025-07-20 PROCEDURE — 93005 ELECTROCARDIOGRAM TRACING: CPT | Performed by: EMERGENCY MEDICINE

## 2025-07-20 PROCEDURE — 36415 COLL VENOUS BLD VENIPUNCTURE: CPT | Performed by: EMERGENCY MEDICINE

## 2025-07-20 PROCEDURE — 81001 URINALYSIS AUTO W/SCOPE: CPT | Performed by: EMERGENCY MEDICINE

## 2025-07-20 PROCEDURE — 99284 EMERGENCY DEPT VISIT MOD MDM: CPT | Performed by: EMERGENCY MEDICINE

## 2025-07-20 PROCEDURE — 84484 ASSAY OF TROPONIN QUANT: CPT | Performed by: EMERGENCY MEDICINE

## 2025-07-20 PROCEDURE — 99284 EMERGENCY DEPT VISIT MOD MDM: CPT | Mod: 25 | Performed by: EMERGENCY MEDICINE

## 2025-07-20 PROCEDURE — 96375 TX/PRO/DX INJ NEW DRUG ADDON: CPT | Performed by: EMERGENCY MEDICINE

## 2025-07-20 PROCEDURE — 87086 URINE CULTURE/COLONY COUNT: CPT | Performed by: EMERGENCY MEDICINE

## 2025-07-20 RX ORDER — KETOROLAC TROMETHAMINE 15 MG/ML
15 INJECTION, SOLUTION INTRAMUSCULAR; INTRAVENOUS ONCE
Status: COMPLETED | OUTPATIENT
Start: 2025-07-20 | End: 2025-07-20

## 2025-07-20 RX ORDER — CEFPODOXIME PROXETIL 200 MG/1
200 TABLET, FILM COATED ORAL 2 TIMES DAILY
Qty: 14 TABLET | Refills: 0 | Status: SHIPPED | OUTPATIENT
Start: 2025-07-20 | End: 2025-07-27

## 2025-07-20 RX ORDER — CEFTRIAXONE 1 G/1
1 INJECTION, POWDER, FOR SOLUTION INTRAMUSCULAR; INTRAVENOUS ONCE
Status: COMPLETED | OUTPATIENT
Start: 2025-07-20 | End: 2025-07-20

## 2025-07-20 RX ADMIN — KETOROLAC TROMETHAMINE 15 MG: 15 INJECTION, SOLUTION INTRAMUSCULAR; INTRAVENOUS at 01:51

## 2025-07-20 RX ADMIN — CEFTRIAXONE SODIUM 1 G: 1 INJECTION, POWDER, FOR SOLUTION INTRAMUSCULAR; INTRAVENOUS at 03:26

## 2025-07-20 ASSESSMENT — COLUMBIA-SUICIDE SEVERITY RATING SCALE - C-SSRS
1. IN THE PAST MONTH, HAVE YOU WISHED YOU WERE DEAD OR WISHED YOU COULD GO TO SLEEP AND NOT WAKE UP?: NO
2. HAVE YOU ACTUALLY HAD ANY THOUGHTS OF KILLING YOURSELF IN THE PAST MONTH?: NO
6. HAVE YOU EVER DONE ANYTHING, STARTED TO DO ANYTHING, OR PREPARED TO DO ANYTHING TO END YOUR LIFE?: NO

## 2025-07-20 ASSESSMENT — ACTIVITIES OF DAILY LIVING (ADL)
ADLS_ACUITY_SCORE: 41

## 2025-07-20 NOTE — ED TRIAGE NOTES
"Patient BIBA for chest pain, abdominal pain x 1 year. Patient states abdominal pain has been worse the past couple of days, states she can feel a \"bump\" in her abdomen and she also states painful urination.  Saw cardiologist yesterday and states everything was negative     Triage Assessment (Adult)       Row Name 07/20/25 0050          Triage Assessment    Airway WDL WDL        Respiratory WDL    Respiratory WDL WDL        Skin Circulation/Temperature WDL    Skin Circulation/Temperature WDL WDL        Cardiac WDL    Cardiac WDL WDL        Peripheral/Neurovascular WDL    Peripheral Neurovascular WDL WDL        Cognitive/Neuro/Behavioral WDL    Cognitive/Neuro/Behavioral WDL WDL                     "

## 2025-07-20 NOTE — ED NOTES
Pt discharged by writer. Pt looks well, given AVS and PIV removed. VSS. Left amb with good gait to exit with friends. Agreeable to plan of care.

## 2025-07-20 NOTE — ED PROVIDER NOTES
"ED Provider Note  [facility]      History     Chief Complaint   Patient presents with    Abdominal Pain    Chest Pain     HPI  Clemente Mancilla is a 70 year old female with a PMH of type II DM, vitamin D deficiency, high cholesterol, and GERD who arrives in the ED via EMS with headaches, left-sided chest pain, abdominal pain, and constipation. The patient reports that she has had a worsening onset of lower abdominal pain for the past two days with associated constipation. The patient reports that she was visiting her sister's house when she had a worsening episode of this constant abdominal pain, and felt \"bumps\" in her lower abdomen, expressing concerns for stools there. The patient denies taking any stool softeners, but reports drinking some type of juice for this, without much relief. She also reports an onset of an associated headache radiating from her scalp to her eyes. Of note, the patient does note some back pain which radiates to her left chest, and has been having palpitations for the past 6 months. She reports being seen yesterday for this, but her EKG was normal. She still endorses some palpitations. She reports taking Tylenol x2 for her symptoms, which helped.     Cardiology note from 7/16/2025: The patient presented with similar symptoms of palpitations but had a negative workup. She was recommended to wear her Zio patch, but she refused. She was started 12.5 mg of Metoprolol.     Past Medical History  Past Medical History:   Diagnosis Date    Arthritis     Diabetes mellitus (H)     Hypertension      Past Surgical History:   Procedure Laterality Date    CHOLECYSTECTOMY  11/06/2000    GYN SURGERY  2003     cefpodoxime (VANTIN) 200 MG tablet  acetaminophen (TYLENOL) 500 MG tablet  alum & mag hydroxide-simethicone (MYLANTA ES/MAALOX  ES) 400-400-40 MG/5ML SUSP suspension  aspirin 81 MG EC tablet  atorvastatin (LIPITOR) 10 MG tablet  blood glucose (NO BRAND SPECIFIED) lancing " "device  blood glucose (NO BRAND SPECIFIED) lancing device  blood glucose calibration (NO BRAND SPECIFIED) solution  blood glucose monitoring (NO BRAND SPECIFIED) meter device kit  blood glucose monitoring (NO BRAND SPECIFIED) test strip  cholecalciferol (VITAMIN D3) 50222 UNITS capsule  meloxicam (MOBIC) 15 MG tablet  metFORMIN (GLUCOPHAGE) 500 MG tablet  omeprazole 20 MG tablet      No Known Allergies  Family History  Family History   Problem Relation Age of Onset    Family History Negative Mother     Family History Negative Father     Family History Negative Other         neg for arthritis     Social History   Social History     Tobacco Use    Smoking status: Never    Smokeless tobacco: Never   Substance Use Topics    Alcohol use: No    Drug use: No      Past medical history, past surgical history, medications, allergies, family history, and social history were reviewed with the patient. No additional pertinent items.   A medically appropriate review of systems was performed with pertinent positives and negatives noted in the HPI, and all other systems negative.    Physical Exam   BP: (!) 144/83  Pulse: 76  Temp: 98.2  F (36.8  C)  Resp: 18  Height: 165.1 cm (5' 5\")  Weight: 77.1 kg (170 lb)  SpO2: 99 %  Physical Exam  General: Afebrile, no acute distress   HEENT: Normocephalic, atraumatic, conjunctivae normal. MMM  Neck: non-tender, supple  Cardio: regular rate. regular rhythm   Resp: Normal work of breathing, no respiratory distress, lungs clear bilaterally, no wheezing, rhonchi, rales  Chest/Back: no visual signs of trauma, no CVA tenderness   Abdomen: soft, non distension, no tenderness, no peritoneal signs   Neuro: alert and fully oriented. CN II-XII grossly intact. Grossly normal strength and sensation in all extremities.   MSK: no deformities. Normal range of motion  Integumentary/Skin: no rash visualized, normal color  Psych: normal affect, normal behavior       ED Course, Procedures, & Data    "   Procedures  .Interpreted by Chica Jacobo MD  Time reviewed: 0059  Symptoms at time of EKG: chest pain   Rhythm: normal sinus   Rate: normal  Axis: normal  Ectopy: none  Conduction: normal  ST Segments/ T Waves: No acute ischemic change, non specific T wave abnormality  Q Waves: none  Comparison to prior: No significant change    Clinical Impression: normal sinus rhythm with no acute ischemic change       Results for orders placed or performed during the hospital encounter of 07/20/25   UA with Microscopic reflex to Culture    Specimen: Urine, Clean Catch   Result Value Ref Range    Color Urine Yellow Colorless, Straw, Light Yellow, Yellow    Appearance Urine Slightly Cloudy (A) Clear    Glucose Urine Negative Negative mg/dL    Bilirubin Urine Negative Negative    Ketones Urine Negative Negative mg/dL    Specific Gravity Urine 1.020 1.003 - 1.035    Blood Urine Large (A) Negative    pH Urine 6.0 5.0 - 7.0    Protein Albumin Urine 100 (A) Negative mg/dL    Urobilinogen Urine Normal Normal mg/dL    Nitrite Urine Positive (A) Negative    Leukocyte Esterase Urine Large (A) Negative    WBC Clumps Urine Present (A) None Seen /HPF    Mucus Urine Present (A) None Seen /LPF    Calcium Oxalate Crystals Urine Few (A) None Seen /HPF    RBC Urine 124 (H) <=2 /HPF    WBC Urine >182 (H) <=5 /HPF    Transitional Epithelials Urine <1 <=1 /HPF   Comprehensive metabolic panel   Result Value Ref Range    Sodium 135 135 - 145 mmol/L    Potassium 4.0 3.4 - 5.3 mmol/L    Carbon Dioxide (CO2) 22 22 - 29 mmol/L    Anion Gap 10 7 - 15 mmol/L    Urea Nitrogen 10.2 8.0 - 23.0 mg/dL    Creatinine 0.70 0.51 - 0.95 mg/dL    GFR Estimate >90 >60 mL/min/1.73m2    Calcium 9.1 8.8 - 10.4 mg/dL    Chloride 103 98 - 107 mmol/L    Glucose 163 (H) 70 - 99 mg/dL    Alkaline Phosphatase 64 40 - 150 U/L    AST 16 0 - 45 U/L    ALT 19 0 - 50 U/L    Protein Total 6.9 6.4 - 8.3 g/dL    Albumin 4.0 3.5 - 5.2 g/dL    Bilirubin Total 0.2 <=1.2 mg/dL    Result Value Ref Range    Troponin T, High Sensitivity <6 <=14 ng/L   CBC with platelets and differential   Result Value Ref Range    WBC Count 8.6 4.0 - 11.0 10e3/uL    RBC Count 4.44 3.80 - 5.20 10e6/uL    Hemoglobin 13.3 11.7 - 15.7 g/dL    Hematocrit 39.2 35.0 - 47.0 %    MCV 88 78 - 100 fL    MCH 30.0 26.5 - 33.0 pg    MCHC 33.9 31.5 - 36.5 g/dL    RDW 12.8 10.0 - 15.0 %    Platelet Count 206 150 - 450 10e3/uL    % Neutrophils 67 %    % Lymphocytes 24 %    % Monocytes 7 %    % Eosinophils 2 %    % Basophils 0 %    % Immature Granulocytes 0 %    NRBCs per 100 WBC 0 <1 /100    Absolute Neutrophils 5.8 1.6 - 8.3 10e3/uL    Absolute Lymphocytes 2.0 0.8 - 5.3 10e3/uL    Absolute Monocytes 0.6 0.0 - 1.3 10e3/uL    Absolute Eosinophils 0.1 0.0 - 0.7 10e3/uL    Absolute Basophils 0.0 0.0 - 0.2 10e3/uL    Absolute Immature Granulocytes 0.0 <=0.4 10e3/uL    Absolute NRBCs 0.0 10e3/uL   Result Value Ref Range    Lipase 26 13 - 60 U/L   EKG 12 lead   Result Value Ref Range    Systolic Blood Pressure  mmHg    Diastolic Blood Pressure  mmHg    Ventricular Rate 76 BPM    Atrial Rate 76 BPM    MT Interval 152 ms    QRS Duration 76 ms     ms    QTc 411 ms    P Axis 50 degrees    R AXIS 21 degrees    T Axis -5 degrees    Interpretation ECG       Sinus rhythm  Nonspecific T wave abnormality  Abnormal ECG  Unconfirmed report - interpretation of this ECG is computer generated - see medical record for final interpretation    Confirmed by - EMERGENCY ROOM, PHYSICIAN ([id number]),  Summer Treadwell ([id number]) on 7/22/2025 9:20:16 AM     Urine Culture    Specimen: Urine, Clean Catch   Result Value Ref Range    Culture >100,000 CFU/mL Escherichia coli (A)        Susceptibility    Escherichia coli - DEANN     Ampicillin  Susceptible ug/mL     Ampicillin/ Sulbactam  Susceptible ug/mL     Piperacillin/Tazobactam  Susceptible ug/mL     Cefazolin  Susceptible ug/mL     Ceftazidime  Susceptible ug/mL     Ceftriaxone  Susceptible ug/mL      Cefepime  Susceptible ug/mL     Gentamicin  Susceptible ug/mL     Ciprofloxacin  Resistant ug/mL     Levofloxacin  Resistant ug/mL     Nitrofurantoin  Susceptible ug/mL     Trimethoprim/Sulfamethoxazole  Susceptible ug/mL     Medications   ketorolac (TORADOL) injection 15 mg (15 mg Intravenous $Given 7/20/25 0825)   cefTRIAXone (ROCEPHIN) 1 g vial to attach to  mL bag for ADULTS or NS 50 mL bag for PEDS (0 g Intravenous Stopped 7/20/25 9750)          Critical care was not performed.     Medical Decision Making  The patient's presentation was of high complexity (an acute health issue posing potential threat to life or bodily function).    The patient's evaluation involved:  review of external note(s) from 2 sources (prior ED notes, cardiology notes)  review of 3+ test result(s) ordered prior to this encounter (labs, EKG)  ordering and/or review of 3+ test(s) in this encounter (see separate area of note for details)    The patient's management necessitated moderate risk (prescription drug management including medications given in the ED) and high risk (a decision regarding hospitalization).    Assessment & Plan    Clemente Mancilla is a 70 year old female with a PMH of type II DM, vitamin D deficiency, high cholesterol, and GERD who arrives in the ED via EMS with headaches, left-sided chest pain, abdominal pain, and constipation.  Upon arrival patient nontoxic-appearing, afebrile, no distress.  Patient likely hypertensive upon arrival 144/83, otherwise hemodynamically stable vital signs within normal limits.  Patient was recently seen by cardiologist yesterday and workup was negative.  Patient also reports chest pain, abdominal pain.  Ongoing for a year.  Patient does report new onset of dysuria.  Differential diagnosis is broad including ACS versus infectious versus metabolic/electrolyte versus cystitis versus pyelonephritis versus chronic chest/abdominal pain given duration of symptoms.     Patient was treated  with IV Toradol upon arrival for pain.  I reviewed EKG which demonstrates normal sinus rhythm with a ventricular rate of 76 bpm no acute ischemic changes and no significant change when compared to prior EKGs.  Comprehensive labs unremarkable with no leukocytosis -white blood cell count 8.6, hemoglobin 13.3, no acute metabolic or electrolyte abnormality, no transaminitis, normal lipase, negative high sensitive troponin less than 6.  Given patient's duration of symptoms, EKG, troponin within normal limit as well as seeing cardiology yesterday with complete workup negative less likely ACS.  Recommend continued outpatient follow-up.  Urinalysis demonstrates infection.  Patient does report new onset of dysuria so we will treat with antibiotics.  Overall patient is nontoxic-appearing, afebrile, abdomen is benign with no peritoneal signs, no flank pain.  Patient was treated with a dose of ceftriaxone in the emergency department and will plan for discharge home with prescription for cefpodoxime.     I discussed results with patient, on reevaluation patient resting comfortably, no distress.  Patient felt comfortable discharge home with close outpatient follow-up.  Return precautions discussed.  Patient and family understand agree with the plan.        I have reviewed the nursing notes. I have reviewed the findings, diagnosis, plan and need for follow up with the patient.    Discharge Medication List as of 7/20/2025  6:03 AM        START taking these medications    Details   cefpodoxime (VANTIN) 200 MG tablet Take 1 tablet (200 mg) by mouth 2 times daily for 7 days., Disp-14 tablet, R-0, Local Print             Final diagnoses:   Acute UTI       Stanley PAPPAS, am serving as a trained medical scribe to document services personally performed by Chica Jacobo MD based on the provider's statements to me on July 20, 2025.  This document has been checked and approved by the attending provider.    IChica MD,  was physically present and have reviewed and verified the accuracy of this note documented by Stanley Sutherland, medical scribe.      Chica Jacobo MD  [facility] EMERGENCY DEPARTMENT  7/20/2025     Chica Jacobo MD  07/24/25 1959

## 2025-07-20 NOTE — DISCHARGE INSTRUCTIONS
Thank you for your patience today.  Please follow-up with your regular doctor in the next 2-3 days for further evaluation and follow-up care.  Please call to schedule an appointment.  Please continue your own medications.  Rest, drink plenty of fluids.  Please take Tylenol or ibuprofen as needed for pain.  Please take antibiotics as directed.  Please return to the ER if you develop any worsening of your current symptoms.  It was a pleasure taking care of you today.  We hope you feel better soon.

## 2025-07-21 LAB — BACTERIA UR CULT: ABNORMAL

## 2025-07-22 ENCOUNTER — TELEPHONE (OUTPATIENT)
Dept: NURSING | Facility: CLINIC | Age: 70
End: 2025-07-22
Payer: COMMERCIAL

## 2025-07-22 LAB
ATRIAL RATE - MUSE: 76 BPM
DIASTOLIC BLOOD PRESSURE - MUSE: NORMAL MMHG
INTERPRETATION ECG - MUSE: NORMAL
P AXIS - MUSE: 50 DEGREES
PR INTERVAL - MUSE: 152 MS
QRS DURATION - MUSE: 76 MS
QT - MUSE: 366 MS
QTC - MUSE: 411 MS
R AXIS - MUSE: 21 DEGREES
SYSTOLIC BLOOD PRESSURE - MUSE: NORMAL MMHG
T AXIS - MUSE: -5 DEGREES
VENTRICULAR RATE- MUSE: 76 BPM

## 2025-07-22 NOTE — LETTER
July 22, 2025        Clemente Mancilla  [id number]  [facility] [address]          Dear Clemente Mancilla:    You were seen in the [facility] Emergency Department at [facility] ([place]) on 7/20/2025.  We are unable to reach you by phone, so we are sending you this letter.     It is important that you call [facility] Emergency Department lab result nurse at [phone], as we have information to relay to you AND/OR we MAY have to make some changes in your treatment.    Best time to call back is between 9AM and 5:30PM, 7 days a week.      Sincerely,     [facility] Emergency Department Lab Result RN  [phone]

## 2025-07-22 NOTE — TELEPHONE ENCOUNTER
[facility] ([place])    Reason for call: Lab Result Notification     Lab Result (including Rx patient on, if applicable).  If culture, copy of lab report at bottom.  Lab Result: Urine Culture - See Below    ED Rx: cefpodoxime (VANTIN) 200 MG tablet - Take 1 tablet (200 mg) by mouth 2 times daily for 7 days   >100,000 CFU/mL Escherichia coli Abnormal  (SUSCEPTIBLE)    Creatinine Level (mg/dl)   Creatinine   Date Value Ref Range Status   07/20/2025 0.70 0.51 - 0.95 mg/dL Final   02/05/2018 0.59 0.52 - 1.04 mg/dL Final    Creatinine clearance (ml/min), if applicable    Serum creatinine: 0.7 mg/dL 07/20/25 0100  Estimated creatinine clearance: 78.9 mL/min     ED Symptoms: Presented to the ED with headaches, left sided chest pain, abdominal pain and constipation. Positive UA.     Current Symptoms: Unable to assess.     RN Recommendations/Instructions per [facility] ED lab result protocol:   [facility] ED lab result protocol utilized: Urine Culture   Continue antibiotic/medication as prescribed: Cefpodoxime - pending patient assessment.     Unable to reach patient/caregiver. Unable to leave a message.     Letter pended to be sent via [employer]PS mail.        CHIN DAVIDSON RN

## 2025-07-22 NOTE — TELEPHONE ENCOUNTER
7/22/25 4:01 pm 2nd Attempt with [place] , unable to reach patient.  Josey Camacho RN  Emergency Department Results Team